# Patient Record
Sex: FEMALE | Race: WHITE
[De-identification: names, ages, dates, MRNs, and addresses within clinical notes are randomized per-mention and may not be internally consistent; named-entity substitution may affect disease eponyms.]

---

## 2017-04-20 ENCOUNTER — HOSPITAL ENCOUNTER (OUTPATIENT)
Dept: HOSPITAL 47 - RADMRIMAIN | Age: 22
Discharge: HOME | End: 2017-04-20
Payer: COMMERCIAL

## 2017-04-20 DIAGNOSIS — R51: ICD-10-CM

## 2017-04-20 DIAGNOSIS — J32.4: Primary | ICD-10-CM

## 2017-04-20 LAB
ALP SERPL-CCNC: 72 U/L (ref 38–126)
ALT SERPL-CCNC: 27 U/L (ref 9–52)
ANION GAP SERPL CALC-SCNC: 9 MMOL/L
AST SERPL-CCNC: 23 U/L (ref 14–36)
BUN SERPL-SCNC: 12 MG/DL (ref 7–17)
CALCIUM SPEC-MCNC: 9.7 MG/DL (ref 8.4–10.2)
CH: 27.3
CHCM: 31.8
CHLORIDE SERPL-SCNC: 108 MMOL/L (ref 98–107)
CO2 SERPL-SCNC: 27 MMOL/L (ref 22–30)
ERYTHROCYTE [DISTWIDTH] IN BLOOD BY AUTOMATED COUNT: 4.64 M/UL (ref 3.8–5.4)
ERYTHROCYTE [DISTWIDTH] IN BLOOD: 14 % (ref 11.5–15.5)
GLUCOSE SERPL-MCNC: 89 MG/DL (ref 74–99)
HCT VFR BLD AUTO: 40 % (ref 34–46)
HDW: 2.66
HGB BLD-MCNC: 12.4 GM/DL (ref 11.4–16)
MCH RBC QN AUTO: 26.8 PG (ref 25–35)
MCHC RBC AUTO-ENTMCNC: 31.1 G/DL (ref 31–37)
MCV RBC AUTO: 86 FL (ref 80–100)
NON-AFRICAN AMERICAN GFR(MDRD): >60
POTASSIUM SERPL-SCNC: 4.3 MMOL/L (ref 3.5–5.1)
PROT SERPL-MCNC: 7.5 G/DL (ref 6.3–8.2)
SODIUM SERPL-SCNC: 144 MMOL/L (ref 137–145)
WBC # BLD AUTO: 7.1 K/UL (ref 3.8–10.6)

## 2017-04-20 PROCEDURE — 70551 MRI BRAIN STEM W/O DYE: CPT

## 2017-04-20 PROCEDURE — 80053 COMPREHEN METABOLIC PANEL: CPT

## 2017-04-20 PROCEDURE — 85027 COMPLETE CBC AUTOMATED: CPT

## 2017-04-20 NOTE — MR
EXAMINATION TYPE: MR brain wo con

 

DATE OF EXAM: 4/20/2017 8:36 AM

 

COMPARISON: NONE

 

HISTORY: headaches

 

Multiplanar and multispin-echo imaging of the brain was performed .  

 

The ventricles, basal cisterns and sulci overlying the cerebral convexities are within normal limits.
 

 

There is no evidence for midline shift or mass effect.  

 

Acute intracranial hemorrhage or extra-axial collection is not evident. 

 

The brain parenchyma reveals no abnormal increased signal.  

 

No acute edema is identified.   

 

There is near complete opacification right maxillary sinus with obstruction of the right ostiomeatal 
unit. Mild mucosal thickening is seen of the left maxillary sinus. Moderate opacification is seen of 
the ethmoid air cells. There is a mild opacification and thickening of the sphenoid sinus and frontal
 sinuses. Nasal septum appears to be deviated from left to right.

 

IMPRESSION:

1. No intracranial abnormalities seen.

2. Severe chronic pansinusitis.

## 2017-06-25 ENCOUNTER — HOSPITAL ENCOUNTER (INPATIENT)
Dept: HOSPITAL 47 - EC | Age: 22
LOS: 2 days | Discharge: HOME | DRG: 153 | End: 2017-06-27
Payer: COMMERCIAL

## 2017-06-25 VITALS — BODY MASS INDEX: 36.3 KG/M2

## 2017-06-25 DIAGNOSIS — M41.9: ICD-10-CM

## 2017-06-25 DIAGNOSIS — E03.9: ICD-10-CM

## 2017-06-25 DIAGNOSIS — D72.810: ICD-10-CM

## 2017-06-25 DIAGNOSIS — J06.9: Primary | ICD-10-CM

## 2017-06-25 DIAGNOSIS — J32.0: ICD-10-CM

## 2017-06-25 DIAGNOSIS — G40.909: ICD-10-CM

## 2017-06-25 DIAGNOSIS — Z79.899: ICD-10-CM

## 2017-06-25 DIAGNOSIS — F32.9: ICD-10-CM

## 2017-06-25 DIAGNOSIS — F41.9: ICD-10-CM

## 2017-06-25 DIAGNOSIS — G89.29: ICD-10-CM

## 2017-06-25 LAB
ALP SERPL-CCNC: 72 U/L (ref 38–126)
ALT SERPL-CCNC: 43 U/L (ref 9–52)
ANION GAP SERPL CALC-SCNC: 10 MMOL/L
AST SERPL-CCNC: 31 U/L (ref 14–36)
BUN SERPL-SCNC: 14 MG/DL (ref 7–17)
CALCIUM SPEC-MCNC: 9.4 MG/DL (ref 8.4–10.2)
CHLORIDE SERPL-SCNC: 106 MMOL/L (ref 98–107)
CO2 SERPL-SCNC: 22 MMOL/L (ref 22–30)
GLUCOSE SERPL-MCNC: 112 MG/DL (ref 74–99)
NON-AFRICAN AMERICAN GFR(MDRD): >60
PARTICLE COUNT: 4096
PH UR: 6.5 [PH] (ref 5–8)
POTASSIUM SERPL-SCNC: 4.4 MMOL/L (ref 3.5–5.1)
PROT SERPL-MCNC: 7.5 G/DL (ref 6.3–8.2)
RBC UR QL: 2 /HPF (ref 0–5)
SODIUM SERPL-SCNC: 138 MMOL/L (ref 137–145)
SP GR UR: 1.02 (ref 1–1.03)
SQUAMOUS UR QL AUTO: 2 /HPF (ref 0–4)
UA BILLING (MACRO VS. MICRO): (no result)
UROBILINOGEN UR QL STRIP: <2 MG/DL (ref ?–2)
WBC #/AREA URNS HPF: 2 /HPF (ref 0–5)

## 2017-06-25 PROCEDURE — 80053 COMPREHEN METABOLIC PANEL: CPT

## 2017-06-25 PROCEDURE — 36415 COLL VENOUS BLD VENIPUNCTURE: CPT

## 2017-06-25 PROCEDURE — 99284 EMERGENCY DEPT VISIT MOD MDM: CPT

## 2017-06-25 PROCEDURE — 71020: CPT

## 2017-06-25 PROCEDURE — 93005 ELECTROCARDIOGRAM TRACING: CPT

## 2017-06-25 PROCEDURE — 81025 URINE PREGNANCY TEST: CPT

## 2017-06-25 PROCEDURE — 87430 STREP A AG IA: CPT

## 2017-06-25 PROCEDURE — 96360 HYDRATION IV INFUSION INIT: CPT

## 2017-06-25 PROCEDURE — 81001 URINALYSIS AUTO W/SCOPE: CPT

## 2017-06-25 PROCEDURE — 80048 BASIC METABOLIC PNL TOTAL CA: CPT

## 2017-06-25 PROCEDURE — 86308 HETEROPHILE ANTIBODY SCREEN: CPT

## 2017-06-25 PROCEDURE — 84100 ASSAY OF PHOSPHORUS: CPT

## 2017-06-25 PROCEDURE — 83605 ASSAY OF LACTIC ACID: CPT

## 2017-06-25 PROCEDURE — 83690 ASSAY OF LIPASE: CPT

## 2017-06-25 PROCEDURE — 87081 CULTURE SCREEN ONLY: CPT

## 2017-06-25 PROCEDURE — 96361 HYDRATE IV INFUSION ADD-ON: CPT

## 2017-06-25 PROCEDURE — 87040 BLOOD CULTURE FOR BACTERIA: CPT

## 2017-06-25 PROCEDURE — 85025 COMPLETE CBC W/AUTO DIFF WBC: CPT

## 2017-06-25 PROCEDURE — 83735 ASSAY OF MAGNESIUM: CPT

## 2017-06-26 VITALS — RESPIRATION RATE: 16 BRPM

## 2017-06-26 LAB
BASOPHILS # BLD AUTO: 0 K/UL (ref 0–0.2)
BASOPHILS NFR BLD AUTO: 0 %
CH: 28.2
CHCM: 33.2
EOSINOPHIL # BLD AUTO: 0 K/UL (ref 0–0.7)
EOSINOPHIL NFR BLD AUTO: 2 %
ERYTHROCYTE [DISTWIDTH] IN BLOOD BY AUTOMATED COUNT: 4.26 M/UL (ref 3.8–5.4)
ERYTHROCYTE [DISTWIDTH] IN BLOOD: 13.5 % (ref 11.5–15.5)
HCT VFR BLD AUTO: 36.2 % (ref 34–46)
HDW: 2.59
HGB BLD-MCNC: 11.8 GM/DL (ref 11.4–16)
LUC NFR BLD AUTO: 3 %
LYMPHOCYTES # SPEC AUTO: 0.2 K/UL (ref 1–4.8)
LYMPHOCYTES NFR SPEC AUTO: 12 %
MCH RBC QN AUTO: 27.7 PG (ref 25–35)
MCHC RBC AUTO-ENTMCNC: 32.6 G/DL (ref 31–37)
MCV RBC AUTO: 85.1 FL (ref 80–100)
MONOCYTES # BLD AUTO: 0.1 K/UL (ref 0–1)
MONOCYTES NFR BLD AUTO: 5 %
NEUTROPHILS # BLD AUTO: 1.2 K/UL (ref 1.3–7.7)
NEUTROPHILS NFR BLD AUTO: 79 %
WBC # BLD AUTO: 0.05 10*3/UL
WBC # BLD AUTO: 1.5 K/UL (ref 3.8–10.6)
WBC (PEROX): 1.79

## 2017-06-26 RX ADMIN — TOPIRAMATE SCH MG: 25 TABLET, FILM COATED ORAL at 09:26

## 2017-06-26 RX ADMIN — SERTRALINE HYDROCHLORIDE SCH MG: 25 TABLET ORAL at 09:26

## 2017-06-26 RX ADMIN — NORGESTIMATE AND ETHINYL ESTRADIOL SCH: KIT at 09:35

## 2017-06-26 RX ADMIN — Medication SCH UNIT: at 13:06

## 2017-06-26 RX ADMIN — NORGESTIMATE AND ETHINYL ESTRADIOL SCH EACH: KIT at 09:26

## 2017-06-26 RX ADMIN — CEFAZOLIN SCH MLS/HR: 330 INJECTION, POWDER, FOR SOLUTION INTRAMUSCULAR; INTRAVENOUS at 19:19

## 2017-06-26 RX ADMIN — CEFAZOLIN SCH: 330 INJECTION, POWDER, FOR SOLUTION INTRAMUSCULAR; INTRAVENOUS at 07:11

## 2017-06-26 RX ADMIN — CEFAZOLIN SCH MLS/HR: 330 INJECTION, POWDER, FOR SOLUTION INTRAMUSCULAR; INTRAVENOUS at 09:27

## 2017-06-26 RX ADMIN — LEVOTHYROXINE SODIUM SCH MCG: 50 TABLET ORAL at 06:25

## 2017-06-26 NOTE — ED
Fever HPI





- General


Chief Complaint: Fever


Stated Complaint: fever


Source: patient


Mode of arrival: ambulatory


Limitations: no limitations





- History of Present Illness


Initial Comments: 





21-year-old  female with no past medical history presented for 

evaluation of fever for the last 2 days.  She denies any other associated 

symptoms although she stated at one point she had a little bit of lower 

abdominal pain for a couple minutes couple days ago which has since resolved.  

She states earlier today she had a little bit of shortness of breath and cough 

for less than a minute which has since resolved.  She denies any lightheadedness

, dizziness, nausea, vomiting, chest pain, cough, abdominal pain.  She does 

admit to a mild frontal headache for the last couple hours but otherwise 

nothing else significant.  No change in vision.  She also had a rash a couple 

days ago but this has also since resolved.





- Related Data


 Home Medications











 Medication  Instructions  Recorded  Confirmed


 


Levothyroxine Sodium [Synthroid] 50 mcg PO DAILY 09/02/16 06/25/17


 


Albuterol Sulfate [Proair Hfa] 2 puff INHALATION RT-QID PRN 06/25/17 06/25/17


 


Cholecalciferol [Vitamin D3] 1,000 unit PO DAILY 06/25/17 06/25/17


 


Norgestimate-Ethinyl Estradiol 1 tab PO DAILY 06/25/17 06/25/17





[Sprintec 28 Day Tablet]   


 


Sertraline [Zoloft] 25 mg PO DAILY 06/25/17 06/25/17


 


Sulfamethox-Tmp 800-160Mg [Bactrim 1 tab PO BID 06/25/17 06/25/17





-160 mg]   


 


Topiramate [Topamax] 50 mg PO DAILY 06/25/17 06/25/17











 Allergies











Allergy/AdvReac Type Severity Reaction Status Date / Time


 


Milk Containing Products Allergy  Swelling Verified 06/25/17 22:30


 


soy Allergy  Swelling Verified 06/25/17 22:30


 


sumatriptan [From Imitrex] Allergy  Anaphylaxis Verified 06/25/17 22:30


 


wheat Allergy  Swelling Verified 06/25/17 22:30














Review of Systems


ROS Statement: 


Those systems with pertinent positive or pertinent negative responses have been 

documented in the HPI.





ROS Other: All systems not noted in ROS Statement are negative.


Constitutional: Reports: fever, chills


Eyes: Denies: eye pain, eye discharge


ENT: Denies: ear pain, throat pain


Respiratory: Reports: dyspnea (Minor episode lasting less than a minute earlier 

today).  Denies: cough


Cardiovascular: Denies: chest pain, palpitations


Endocrine: Denies: fatigue, polydipsia, polyuria


Gastrointestinal: Denies: abdominal pain, nausea, vomiting, diarrhea, 

constipation


Genitourinary: Denies: urgency, dysuria, frequency


Musculoskeletal: Denies: back pain, arthralgia, myalgia


Skin: Reports: rash (Nearly resolved).  Denies: lesions


Neurological: Denies: headache, weakness


Psychiatric: Denies: anxiety, depression


Hematological/Lymphatic: Denies: easy bleeding, easy bruising





Past Medical History


Past Medical History: No Reported History


History of Any Multi-Drug Resistant Organisms: None Reported


Past Surgical History: No Surgical Hx Reported


Past Psychological History: Anxiety, Depression


Smoking Status: Never smoker


Past Alcohol Use History: Rare


Past Drug Use History: None Reported





General Exam


Limitations: no limitations


General appearance: alert, in no apparent distress


Head exam: Present: atraumatic, normocephalic, normal inspection


Eye exam: Present: normal appearance, PERRL, EOMI.  Absent: scleral icterus, 

conjunctival injection, periorbital swelling


ENT exam: Present: normal exam, mucous membranes moist


Neck exam: Present: normal inspection.  Absent: tenderness, meningismus, 

lymphadenopathy


Respiratory exam: Present: normal lung sounds bilaterally.  Absent: respiratory 

distress, wheezes, rales, rhonchi, stridor


Cardiovascular Exam: Present: regular rate, normal rhythm, normal heart sounds.

  Absent: systolic murmur, diastolic murmur, rubs, gallop, clicks


GI/Abdominal exam: Present: soft, normal bowel sounds.  Absent: distended, 

tenderness, guarding, rebound, rigid


Rectal exam: Present: deferred


Extremities exam: Present: normal inspection, full ROM, normal capillary 

refill.  Absent: tenderness, pedal edema, joint swelling, calf tenderness


Back exam: Present: normal inspection


Neurological exam: Present: alert, oriented X3, CN II-XII intact


Psychiatric exam: Present: normal affect, normal mood


Skin exam: Present: warm, dry, intact, rash (Right medial upper arm nearly 

resolved.  There are also areas where she has been scratching causing minor 

abrasions to the skin.)





Course


 Vital Signs











  06/25/17 06/26/17 06/26/17





  22:09 00:04 01:31


 


Temperature 103.1 F H 100.4 F H 100.7 F H


 


Pulse Rate 141 H 120 H 119 H


 


Respiratory 22 16 18





Rate   


 


Blood Pressure 127/81 137/76 132/77


 


O2 Sat by Pulse 99 98 98





Oximetry   














Medical Decision Making





- Medical Decision Making





21-year-old  female presented for evaluation of fever for 2 days.  On 

HPI there is no source of infection elicited as she denies any abdominal pain, 

dysuria, shortness of breath/cough, and only admits to a mild headache started 

today without neck stiffness.  She does have chronic back pain due to scoliosis 

and this is unchanged.  On physical examination lungs are clear to auscultation 

bilaterally abdomen is soft and nontender without peritoneal signs of guarding, 

rigidity, rebound, and she is neurologically intact with cranial nerves II 

through XII intact without focal neurologic deficits and normal gait and 

station.  Vitals reveal a temperature of 103.1 and a markedly tachycardia of 

140.  Patient was provided with IV fluid and labs revealed a marked leukopenia 

although her vitals improved mildly with a temperature of 100.4 and a 

tachycardia of 120.  There is no source of infection and therefore she has not 

technically meet for sepsis.  Her lactic acid is below 2.  However we'll cover 

with antibiotics and admit patient to Dr. Bains for further treatment and 

evaluation in the morning. Bed request submitted and admission order placed.





- Lab Data


Result diagrams: 


 06/26/17 00:04





 06/25/17 23:19


 Lab Results











  06/25/17 06/25/17 06/25/17 Range/Units





  23:19 23:19 23:19 


 


WBC     (3.8-10.6)  k/uL


 


RBC     (3.80-5.40)  m/uL


 


Hgb     (11.4-16.0)  gm/dL


 


Hct     (34.0-46.0)  %


 


MCV     (80.0-100.0)  fL


 


MCH     (25.0-35.0)  pg


 


MCHC     (31.0-37.0)  g/dL


 


RDW     (11.5-15.5)  %


 


Plt Count     (150-450)  k/uL


 


Neutrophils %     %


 


Lymphocytes %     %


 


Monocytes %     %


 


Eosinophils %     %


 


Basophils %     %


 


Neutrophils #     (1.3-7.7)  k/uL


 


Lymphocytes #     (1.0-4.8)  k/uL


 


Monocytes #     (0-1.0)  k/uL


 


Eosinophils #     (0-0.7)  k/uL


 


Basophils #     (0-0.2)  k/uL


 


Sodium   138   (137-145)  mmol/L


 


Potassium   4.4   (3.5-5.1)  mmol/L


 


Chloride   106   ()  mmol/L


 


Carbon Dioxide   22   (22-30)  mmol/L


 


Anion Gap   10   mmol/L


 


BUN   14   (7-17)  mg/dL


 


Creatinine   1.00   (0.52-1.04)  mg/dL


 


Est GFR (MDRD) Af Amer   >60   (>60 ml/min/1.73 sqM)  


 


Est GFR (MDRD) Non-Af   >60   (>60 ml/min/1.73 sqM)  


 


Glucose   112 H   (74-99)  mg/dL


 


Plasma Lactic Acid Jagdeep     (0.7-2.0)  mmol/L


 


Calcium   9.4   (8.4-10.2)  mg/dL


 


Total Bilirubin   0.4   (0.2-1.3)  mg/dL


 


AST   31   (14-36)  U/L


 


ALT   43   (9-52)  U/L


 


Alkaline Phosphatase   72   ()  U/L


 


Total Protein   7.5   (6.3-8.2)  g/dL


 


Albumin   4.5   (3.5-5.0)  g/dL


 


Lipase   67   ()  U/L


 


Urine Color  Yellow    


 


Urine Appearance  Clear    (Clear)  


 


Urine pH  6.5    (5.0-8.0)  


 


Ur Specific Gravity  1.025    (1.001-1.035)  


 


Urine Protein  Trace H    (Negative)  


 


Urine Glucose (UA)  Negative    (Negative)  


 


Urine Ketones  Negative    (Negative)  


 


Urine Blood  Moderate H    (Negative)  


 


Urine Nitrite  Negative    (Negative)  


 


Urine Bilirubin  Negative    (Negative)  


 


Urine Urobilinogen  <2.0    (<2.0)  mg/dL


 


Ur Leukocyte Esterase  Negative    (Negative)  


 


Urine RBC  2    (0-5)  /hpf


 


Urine WBC  2    (0-5)  /hpf


 


Ur Squamous Epith Cells  2    (0-4)  /hpf


 


Urine Mucus  Rare H    (None)  /hpf


 


Urine HCG, Qual    Not Detected  (Not Detectd)  


 


Group A Strep Rapid     (Negative)  














  06/25/17 06/25/17 06/26/17 Range/Units





  23:19 23:19 00:04 


 


WBC    1.5 L*  (3.8-10.6)  k/uL


 


RBC    4.26  (3.80-5.40)  m/uL


 


Hgb    11.8  (11.4-16.0)  gm/dL


 


Hct    36.2  (34.0-46.0)  %


 


MCV    85.1  (80.0-100.0)  fL


 


MCH    27.7  (25.0-35.0)  pg


 


MCHC    32.6  (31.0-37.0)  g/dL


 


RDW    13.5  (11.5-15.5)  %


 


Plt Count    179  (150-450)  k/uL


 


Neutrophils %    79  %


 


Lymphocytes %    12  %


 


Monocytes %    5  %


 


Eosinophils %    2  %


 


Basophils %    0  %


 


Neutrophils #    1.2 L  (1.3-7.7)  k/uL


 


Lymphocytes #    0.2 L  (1.0-4.8)  k/uL


 


Monocytes #    0.1  (0-1.0)  k/uL


 


Eosinophils #    0.0  (0-0.7)  k/uL


 


Basophils #    0.0  (0-0.2)  k/uL


 


Sodium     (137-145)  mmol/L


 


Potassium     (3.5-5.1)  mmol/L


 


Chloride     ()  mmol/L


 


Carbon Dioxide     (22-30)  mmol/L


 


Anion Gap     mmol/L


 


BUN     (7-17)  mg/dL


 


Creatinine     (0.52-1.04)  mg/dL


 


Est GFR (MDRD) Af Amer     (>60 ml/min/1.73 sqM)  


 


Est GFR (MDRD) Non-Af     (>60 ml/min/1.73 sqM)  


 


Glucose     (74-99)  mg/dL


 


Plasma Lactic Acid Jagdeep  1.5    (0.7-2.0)  mmol/L


 


Calcium     (8.4-10.2)  mg/dL


 


Total Bilirubin     (0.2-1.3)  mg/dL


 


AST     (14-36)  U/L


 


ALT     (9-52)  U/L


 


Alkaline Phosphatase     ()  U/L


 


Total Protein     (6.3-8.2)  g/dL


 


Albumin     (3.5-5.0)  g/dL


 


Lipase     ()  U/L


 


Urine Color     


 


Urine Appearance     (Clear)  


 


Urine pH     (5.0-8.0)  


 


Ur Specific Gravity     (1.001-1.035)  


 


Urine Protein     (Negative)  


 


Urine Glucose (UA)     (Negative)  


 


Urine Ketones     (Negative)  


 


Urine Blood     (Negative)  


 


Urine Nitrite     (Negative)  


 


Urine Bilirubin     (Negative)  


 


Urine Urobilinogen     (<2.0)  mg/dL


 


Ur Leukocyte Esterase     (Negative)  


 


Urine RBC     (0-5)  /hpf


 


Urine WBC     (0-5)  /hpf


 


Ur Squamous Epith Cells     (0-4)  /hpf


 


Urine Mucus     (None)  /hpf


 


Urine HCG, Qual     (Not Detectd)  


 


Group A Strep Rapid   Negative   (Negative)  














06/26/17 02:04


Sinus tachycardia with ventricular rate 123, JORGE 124, QRS 78, QT//426.





Disposition


Clinical Impression: 


 Fever, Leukopenia, Tachycardia





Disposition: ADMITTED AS IP TO THIS HOSP


Referrals: 


JUDY Pandya III, MD [Primary Care Provider] - 1-2 days


Decision to Admit Reason: Admit from EC


Decision Date: 06/26/17


Decision Time: 01:42

## 2017-06-26 NOTE — XR
EXAM:

  XR Chest, 2 Views

 

CLINICAL HISTORY:

  Reason: cough

 

TECHNIQUE:

  Frontal and lateral views of the chest.

 

COMPARISON:

  No relevant prior studies available.

 

FINDINGS:

  Lungs:  Unremarkable.  No consolidation.

  Pleural space:  Unremarkable.  No pneumothorax.

  Heart:  Unremarkable.  No cardiomegaly.

  Mediastinum:  Unremarkable.

  Bones/joints:  Unremarkable.

 

IMPRESSION:     

  Unremarkable chest x-rays.

## 2017-06-26 NOTE — P.HPIM
History of Present Illness


H&P Date: 06/26/17





21-year-old female with history of right-sided sinusitis who has been recently 

started on Bactrim by an ENT physician comes in to the hospital with complaints 

of fevers that has been ongoing for the last 3 days.  There is no specific time 

and the fever appears to occur





Patient's T-max has been 103F





States to have some complains of a cough nonproductive in nature





Patient has undergone a rapid strep test which was negative





States to have generalized weakness no headaches blurry vision neck stiffness 

nausea vomiting chest pain urinary urgency or frequency vaginal discharge





Patient is monogamous with one male





Was noted to have severe leukopenia denies having any lumps in her throat 

axilla or her groin





Review of Systems


All systems: negative (Noted in HPI)





Past Medical History


Past Medical History: No Reported History


Additional Past Medical History / Comment(s): hypothyroid


History of Any Multi-Drug Resistant Organisms: None Reported


Past Surgical History: No Surgical Hx Reported


Past Psychological History: Anxiety, Depression


Smoking Status: Never smoker


Past Alcohol Use History: Rare


Past Drug Use History: None Reported





Medications and Allergies


Home Medications and Allergies Comment(s): 


Physical exam Gen. appearance oriented 3 in no distress





Neck is supple no JVD





Lungs good air entry clear to auscultation no rhonchi or wheezing





Heart S1-S2 heard regular rate and rhythm no murmurs appreciated 





Abdomen is soft nontender no organomegaly bowel sounds are intact





Neurologically cranial nerves II-12 grossly intact no focal motor or sensory 

deficits noted





Skin no abnormalities appreciated





Oral cavity no significant tonsillar edema no erythema in the posterior pharynx





No lymphadenopathy noted in her cervical region axillary region supraclavicular 

region or her inguinal area





 Home Medications











 Medication  Instructions  Recorded  Confirmed  Type


 


Levothyroxine Sodium [Synthroid] 50 mcg PO DAILY 09/02/16 06/25/17 History


 


Albuterol Sulfate [Proair Hfa] 2 puff INHALATION RT-QID PRN 06/25/17 06/25/17 

History


 


Cholecalciferol [Vitamin D3] 1,000 unit PO DAILY 06/25/17 06/25/17 History


 


Norgestimate-Ethinyl Estradiol 1 tab PO DAILY 06/25/17 06/25/17 History





[Sprintec 28 Day Tablet]    


 


Sertraline [Zoloft] 25 mg PO DAILY 06/25/17 06/25/17 History


 


Sulfamethox-Tmp 800-160Mg [Bactrim 1 tab PO BID 06/25/17 06/25/17 History





-160 mg]    


 


Topiramate [Topamax] 50 mg PO DAILY 06/25/17 06/25/17 History











 Allergies











Allergy/AdvReac Type Severity Reaction Status Date / Time


 


ceftriaxone [From Rocephin] Allergy  Rash/Hives Verified 06/26/17 06:12


 


Milk Containing Products Allergy  Swelling Verified 06/25/17 22:30


 


soy Allergy  Swelling Verified 06/25/17 22:30


 


sumatriptan [From Imitrex] Allergy  Anaphylaxis Verified 06/25/17 22:30


 


wheat Allergy  Swelling Verified 06/25/17 22:30














Physical Exam


Vitals: 


 Vital Signs











  Temp Pulse Pulse Resp BP BP Pulse Ox


 


 06/26/17 19:25  97.7 F   76  16   129/70  97


 


 06/26/17 15:00  97.9 F   80  16   114/79  97


 


 06/26/17 08:00    117 H    


 


 06/26/17 07:00  98.8 F   117 H  16   116/67  94 L


 


 06/26/17 04:23  103.3 F H   137 H  16   118/71  100


 


 06/26/17 01:57  99.5 F  119 H   16  132/77   99


 


 06/26/17 01:31  100.7 F H  119 H   18  132/77   98


 


 06/26/17 00:04  100.4 F H  120 H   16  137/76   98


 


 06/25/17 22:09  103.1 F H  141 H   22  127/81   99








 Intake and Output











 06/26/17 06/26/17 06/26/17





 06:59 14:59 22:59


 


Intake Total  2598 


 


Balance  2598 


 


Intake:   


 


  Intake, IV Titration  938 





  Amount   


 


    Sodium Chloride 0.9% 1,  938 





    000 ml @ 125 mls/hr IV .   





    [facility] Rx#:[id number]   


 


  Oral  1660 


 


Other:   


 


  Weight 90.265 kg  














Results


CBC & Chem 7: 


 06/26/17 00:04





 06/25/17 23:19


Labs: 


 Abnormal Lab Results - Last 24 Hours (Table)











  06/25/17 06/25/17 06/26/17 Range/Units





  23:19 23:19 00:04 


 


WBC    1.5 L*  (3.8-10.6)  k/uL


 


Neutrophils #    1.2 L  (1.3-7.7)  k/uL


 


Lymphocytes #    0.2 L  (1.0-4.8)  k/uL


 


Glucose   112 H   (74-99)  mg/dL


 


Urine Protein  Trace H    (Negative)  


 


Urine Blood  Moderate H    (Negative)  


 


Urine Mucus  Rare H    (None)  /hpf








 Microbiology - Last 24 Hours (Table)











 06/25/17 23:19 Group A Strep Throat Culture - Preliminary





 Throat 














Thrombosis Risk Factor Assmnt





- Choose All That Apply


Each Factor Represents 1 point: Obesity (BMI >25), Oral contraceptives or 

hormone replacement therapy


Thrombosis Risk Factor Assessment Total Risk Factor Score: 2


Thrombosis Risk Factor Assessment Level: Low Risk





Assessment and Plan


Plan: 





#1 leukopenia with severe lymphopenia likely secondary to viral etiology





#2 acute viral URI causing fevers





#3 history of seizure disorder





#4 hypothyroidism





#5 chronic right-sided sinusitis of the maxillary sinus





Plan





Continue with IV hydration





We'll obtain a heterophile antibody repeat labs in a.m.  Patient received a 

dose of Decadron this morning





Patient also received a dose of Rocephin on admission per patient was noted to 

have a rash





If patient's white count does not improve will need to evaluate certain rare 

disorders patient's onset of symptoms and rapidity of for symptoms appear to be 

viral in nature we'll hold off on antibiotic therapy at this time I do not 

notice a rash at this time

## 2017-06-27 VITALS — HEART RATE: 64 BPM | DIASTOLIC BLOOD PRESSURE: 56 MMHG | TEMPERATURE: 97.9 F | SYSTOLIC BLOOD PRESSURE: 120 MMHG

## 2017-06-27 LAB
ANION GAP SERPL CALC-SCNC: 11 MMOL/L
BASOPHILS # BLD AUTO: 0 K/UL (ref 0–0.2)
BASOPHILS NFR BLD AUTO: 0 %
BUN SERPL-SCNC: 8 MG/DL (ref 7–17)
CALCIUM SPEC-MCNC: 9 MG/DL (ref 8.4–10.2)
CH: 27.8
CHCM: 33.3
CHLORIDE SERPL-SCNC: 110 MMOL/L (ref 98–107)
CO2 SERPL-SCNC: 19 MMOL/L (ref 22–30)
EOSINOPHIL # BLD AUTO: 0 K/UL (ref 0–0.7)
EOSINOPHIL NFR BLD AUTO: 1 %
ERYTHROCYTE [DISTWIDTH] IN BLOOD BY AUTOMATED COUNT: 4.19 M/UL (ref 3.8–5.4)
ERYTHROCYTE [DISTWIDTH] IN BLOOD: 13.5 % (ref 11.5–15.5)
GLUCOSE SERPL-MCNC: 109 MG/DL (ref 74–99)
HCT VFR BLD AUTO: 35.1 % (ref 34–46)
HDW: 2.72
HGB BLD-MCNC: 11.6 GM/DL (ref 11.4–16)
LUC NFR BLD AUTO: 4 %
LYMPHOCYTES # SPEC AUTO: 0.5 K/UL (ref 1–4.8)
LYMPHOCYTES NFR SPEC AUTO: 20 %
MAGNESIUM SPEC-SCNC: 1.8 MG/DL (ref 1.6–2.3)
MCH RBC QN AUTO: 27.6 PG (ref 25–35)
MCHC RBC AUTO-ENTMCNC: 32.9 G/DL (ref 31–37)
MCV RBC AUTO: 83.7 FL (ref 80–100)
MONOCYTES # BLD AUTO: 0.2 K/UL (ref 0–1)
MONOCYTES NFR BLD AUTO: 7 %
NEUTROPHILS # BLD AUTO: 1.7 K/UL (ref 1.3–7.7)
NEUTROPHILS NFR BLD AUTO: 69 %
NON-AFRICAN AMERICAN GFR(MDRD): >60
PHOSPHATE SERPL-MCNC: 3.1 MG/DL (ref 2.5–4.5)
POTASSIUM SERPL-SCNC: 3.9 MMOL/L (ref 3.5–5.1)
SODIUM SERPL-SCNC: 140 MMOL/L (ref 137–145)
WBC # BLD AUTO: 0.09 10*3/UL
WBC # BLD AUTO: 2.5 K/UL (ref 3.8–10.6)
WBC (PEROX): 2.82

## 2017-06-27 RX ADMIN — SERTRALINE HYDROCHLORIDE SCH MG: 25 TABLET ORAL at 08:36

## 2017-06-27 RX ADMIN — Medication SCH: at 13:20

## 2017-06-27 RX ADMIN — CEFAZOLIN SCH MLS/HR: 330 INJECTION, POWDER, FOR SOLUTION INTRAMUSCULAR; INTRAVENOUS at 08:37

## 2017-06-27 RX ADMIN — CEFAZOLIN SCH: 330 INJECTION, POWDER, FOR SOLUTION INTRAMUSCULAR; INTRAVENOUS at 03:06

## 2017-06-27 RX ADMIN — LEVOTHYROXINE SODIUM SCH MCG: 50 TABLET ORAL at 05:45

## 2017-06-27 RX ADMIN — TOPIRAMATE SCH MG: 25 TABLET, FILM COATED ORAL at 08:36

## 2017-06-27 NOTE — P.DS
Providers


Date of admission: 


06/26/17 01:40





Attending physician: 


Charlee Bains





Primary care physician: 


[facility] [facility] 





21-year-old female with history of right-sided sinusitis who has been recently 

started on Bactrim by an ENT physician comes in to the hospital with complaints 

of fevers that has been ongoing for the last 3 days.  There is no specific time 

and the fever appears to occur





Patient's T-max has been 103F





States to have some complains of a cough nonproductive in nature





Patient has undergone a rapid strep test which was negative





States to have generalized weakness no headaches blurry vision neck stiffness 

nausea vomiting chest pain urinary urgency or frequency vaginal discharge





Patient is monogamous with one male





Was noted to have severe leukopenia denies having any lumps in her throat 

axilla or her groin





06/27/2017





No overnight events





No fevers were reported patient states to be feeling better





Brief history patient was admitted with fevers of unknown origin 3 days


Physical exam Gen. appearance oriented 3 in no distress





Neck is supple no JVD





Lungs good air entry clear to auscultation no rhonchi or wheezing





Heart S1-S2 heard regular rate and rhythm no murmurs appreciated 





Abdomen is soft nontender no organomegaly bowel sounds are intact





Neurologically cranial nerves II-12 grossly intact no focal motor or sensory 

deficits noted





Skin no abnormalities appreciated





Oral cavity no significant tonsillar edema no erythema in the posterior pharynx





No lymphadenopathy noted in her cervical region axillary region supraclavicular 

region or her inguinal area











Assessment and Plan


Plan: 





#1 leukopenia with severe lymphopenia likely secondary to viral etiology versus 

drug-induced





#2 acute viral URI causing fevers





#3 history of seizure disorder





#4 hypothyroidism





#5 chronic right-sided sinusitis of the maxillary sinus








Discontinue Bactrim at this time





Patient has been afebrile for 24 hours white count has improved if this is a 

viral etiology patient will need a repeat CBC in 7 days to ensure improvement 

of her white count if patient continues to have symptoms thereafter would 

benefit from further workup patient does not have any lymph nodes that are 

enlarged heterophile antibody and strep test were negative


e





Plan - Discharge Summary


New Discharge Prescriptions: 


Continue


   Levothyroxine Sodium [Synthroid] 50 mcg PO DAILY


   Norgestimate-Ethinyl Estradiol [Sprintec 28 Day Tablet] 1 tab PO DAILY


   Albuterol Sulfate [Proair Hfa] 2 puff INHALATION RT-QID PRN


     PRN Reason: Shortness Of Breath


   Topiramate [Topamax] 50 mg PO DAILY


   Sertraline [Zoloft] 25 mg PO DAILY


   Cholecalciferol [Vitamin D3] 1,000 unit PO DAILY





Discontinued


   Sulfamethox-Tmp 800-160Mg [Bactrim -160 mg] 1 tab PO BID


Discharge Medication List





Levothyroxine Sodium [Synthroid] 50 mcg PO DAILY 09/02/16 [History]


Albuterol Sulfate [Proair Hfa] 2 puff INHALATION RT-QID PRN 06/25/17 [History]


Cholecalciferol [Vitamin D3] 1,000 unit PO DAILY 06/25/17 [History]


Norgestimate-Ethinyl Estradiol [Sprintec 28 Day Tablet] 1 tab PO DAILY 06/25/17 

[History]


Sertraline [Zoloft] 25 mg PO DAILY 06/25/17 [History]


Topiramate [Topamax] 50 mg PO DAILY 06/25/17 [History]








Follow up Appointment(s)/Referral(s): 


JUDY Garcia III, MD [Primary Care Provider] - 06/28/17 11:00 am


Patient Instructions/Handouts:  Fever in Adults (GEN)


Care Plan Goals (MU): 


CBC WITH DR. GARCIA IN 1 WEEK





Discharge Disposition: HOME SELF-CARE

## 2018-11-06 ENCOUNTER — HOSPITAL ENCOUNTER (EMERGENCY)
Dept: HOSPITAL 47 - EC | Age: 23
Discharge: HOME | End: 2018-11-06
Payer: COMMERCIAL

## 2018-11-06 VITALS — SYSTOLIC BLOOD PRESSURE: 132 MMHG | RESPIRATION RATE: 18 BRPM | DIASTOLIC BLOOD PRESSURE: 80 MMHG | TEMPERATURE: 98.3 F

## 2018-11-06 VITALS — HEART RATE: 92 BPM

## 2018-11-06 DIAGNOSIS — Z88.1: ICD-10-CM

## 2018-11-06 DIAGNOSIS — R09.81: ICD-10-CM

## 2018-11-06 DIAGNOSIS — Z91.018: ICD-10-CM

## 2018-11-06 DIAGNOSIS — Z88.2: ICD-10-CM

## 2018-11-06 DIAGNOSIS — R05: ICD-10-CM

## 2018-11-06 DIAGNOSIS — R06.02: ICD-10-CM

## 2018-11-06 DIAGNOSIS — O99.89: Primary | ICD-10-CM

## 2018-11-06 DIAGNOSIS — Z3A.33: ICD-10-CM

## 2018-11-06 PROCEDURE — 94640 AIRWAY INHALATION TREATMENT: CPT

## 2018-11-06 PROCEDURE — 71046 X-RAY EXAM CHEST 2 VIEWS: CPT

## 2018-11-06 PROCEDURE — 99285 EMERGENCY DEPT VISIT HI MDM: CPT

## 2018-11-06 NOTE — XR
EXAMINATION TYPE: XR chest 2V

 

DATE OF EXAM: 11/6/2018

 

COMPARISON: 6/26/2017

 

HISTORY: Cough

 

TECHNIQUE:  Frontal and lateral views of the chest are obtained.

 

FINDINGS:  Heart and mediastinum are normal. Lungs are clear. Diaphragm is normal. Bony thorax is int
act. Pulmonary vascularity is normal.

 

IMPRESSION:  Normal chest. No change.

## 2018-11-06 NOTE — ED
SOB HPI





- General


Chief Complaint: Shortness of Breath


Stated Complaint: SOB


Time Seen by Provider: 18 02:39


Source: patient, RN notes reviewed, old records reviewed


Mode of arrival: ambulatory


Limitations: no limitations





- History of Present Illness


Initial Comments: 





Patient is a 23 year old female,  that is 33 weeks pregnant. Patient is 

here for cough and congestion. She had a cold last week, and reports that it 

has settled into her chest. Patient reports that she has a dry non productive 

cough. She is a non smoker, with history of asthma. Patient has no chest pain, 

denies abdominal pain or vaginal bleeding or discharge. Patient stats no fevers 

or chills. Denies leg swelling, hemoptysis. 





- Related Data


 Home Medications











 Medication  Instructions  Recorded  Confirmed


 


Pnv No.95/Ferrous Fum/Folic AC 1 each PO DAILY 18





[Prenatal Multivitamin Tablet]   








 Previous Rx's











 Medication  Instructions  Recorded


 


Albuterol Inhaler [Ventolin Hfa 1 - 2 puff INHALATION RT-Q6H PRN 18





Inhaler] #1 inhaler 


 


Amoxicillin 500 mg PO Q8H #21 capsule 18











 Allergies











Allergy/AdvReac Type Severity Reaction Status Date / Time


 


ceftriaxone [From Rocephin] Allergy  Rash/Hives Verified 17 06:12


 


soy Allergy  Swelling Verified 17 22:30


 


sulfamethoxazole Allergy  Rash/Hives Verified 18 02:35





[From Bactrim]     


 


sumatriptan [From Imitrex] Allergy  Anaphylaxis Verified 17 22:30


 


trimethoprim [From Bactrim] Allergy  Rash/Hives Verified 18 02:35














Review of Systems


ROS Statement: 


Those systems with pertinent positive or pertinent negative responses have been 

documented in the HPI.





ROS Other: All systems not noted in ROS Statement are negative.





Past Medical History


Past Medical History: No Reported History


Additional Past Medical History / Comment(s): hypothyroid


History of Any Multi-Drug Resistant Organisms: None Reported


Past Surgical History: No Surgical Hx Reported


Past Psychological History: Anxiety, Depression


Smoking Status: Never smoker


Past Alcohol Use History: Rare


Past Drug Use History: None Reported





General Exam





- General Exam Comments


Initial Comments: 





This is a 23 year old female, no acute distress. 


Limitations: no limitations


General appearance: alert, in no apparent distress


Head exam: Present: atraumatic, normocephalic, normal inspection


Eye exam: Present: normal appearance, PERRL, EOMI.  Absent: scleral icterus, 

conjunctival injection, periorbital swelling


ENT exam: Present: normal exam, mucous membranes moist.  Absent: normal 

oropharynx (erythematous oropharynx)


Neck exam: Present: lymphadenopathy (tonsillar)


Respiratory exam: Present: wheezes (slight wheezing in lower lung field).  

Absent: normal lung sounds bilaterally, respiratory distress, rales, rhonchi, 

stridor


Cardiovascular Exam: Present: regular rate, normal rhythm, normal heart sounds.

  Absent: systolic murmur, diastolic murmur, rubs, gallop, clicks


GI/Abdominal exam: Present: soft, normal bowel sounds.  Absent: distended, 

tenderness, guarding, rebound, rigid


Extremities exam: Present: normal inspection, full ROM, normal capillary 

refill.  Absent: tenderness, pedal edema, joint swelling, calf tenderness


Back exam: Present: normal inspection


Neurological exam: Present: alert, oriented X3, CN II-XII intact


Psychiatric exam: Present: normal affect, normal mood


Skin exam: Present: warm, dry, intact, normal color.  Absent: rash





Course


 Vital Signs











  18





  02:32 03:18 03:25


 


Temperature 98.3 F  


 


Pulse Rate 90 84 92


 


Respiratory 18  





Rate   


 


Blood Pressure 132/80  


 


O2 Sat by Pulse 97  





Oximetry   














Medical Decision Making





- Medical Decision Making





23 year old female, 33 weeks pregnant complains of cough, congestion and 

shortness of breath. Denies chest pain. patient has slight wheezing on exam.  

Patient has no swelling, patient has no chest pain. Patient symptoms seem to be 

upper respiratory infection, less clinical concern for PE. PAtient agrees. 

Discussed starting patient on inhaler and amoxicillin for upper respiratory 

infection and sinus congestion. Discussed strict return parameters if she has  

chest pain, or any other concern. Patient agrees to treatment plan and will 

comply. 





Disposition


Clinical Impression: 


 Cough





Disposition: HOME SELF-CARE


Condition: Good


Instructions:  Bronchospasm (ED)


Additional Instructions: 


Patient advised to rest, drink plenty of fluids.  Patient should take the 

medications as prescribed.  Follow-up with primary care physician and OB/GYN.  

Return to emergency department if any alarming signs or symptoms occur.


Prescriptions: 


Albuterol Inhaler [Ventolin Hfa Inhaler] 1 - 2 puff INHALATION RT-Q6H PRN #1 

inhaler


 PRN Reason: Shortness Of Breath


Amoxicillin 500 mg PO Q8H #21 capsule


Is patient prescribed a controlled substance at d/c from ED?: No


Referrals: 


None,Stated [Primary Care Provider] - 1-2 days


Lizzie Mckeon MD [STAFF PHYSICIAN] - 1-2 days


Time of Disposition: 04:36

## 2018-11-08 ENCOUNTER — HOSPITAL ENCOUNTER (EMERGENCY)
Dept: HOSPITAL 47 - EC | Age: 23
Discharge: HOME | End: 2018-11-08
Payer: COMMERCIAL

## 2018-11-08 VITALS — RESPIRATION RATE: 20 BRPM

## 2018-11-08 VITALS — TEMPERATURE: 98.5 F | HEART RATE: 83 BPM | SYSTOLIC BLOOD PRESSURE: 122 MMHG | DIASTOLIC BLOOD PRESSURE: 72 MMHG

## 2018-11-08 DIAGNOSIS — Z88.2: ICD-10-CM

## 2018-11-08 DIAGNOSIS — Z88.8: ICD-10-CM

## 2018-11-08 DIAGNOSIS — Z91.018: ICD-10-CM

## 2018-11-08 DIAGNOSIS — Z88.1: ICD-10-CM

## 2018-11-08 DIAGNOSIS — J03.90: Primary | ICD-10-CM

## 2018-11-08 PROCEDURE — 96372 THER/PROPH/DIAG INJ SC/IM: CPT

## 2018-11-08 PROCEDURE — 87430 STREP A AG IA: CPT

## 2018-11-08 PROCEDURE — 87081 CULTURE SCREEN ONLY: CPT

## 2018-11-08 PROCEDURE — 99283 EMERGENCY DEPT VISIT LOW MDM: CPT

## 2018-11-08 NOTE — ED
General Adult HPI





- General


Chief complaint: Allergic Reaction


Stated complaint: Alergic reaction


Time Seen by Provider: 11/08/18 02:31


Source: patient, family


Mode of arrival: ambulatory


Limitations: no limitations





- History of Present Illness


Initial comments: 


23-year-old female patient presents to the emergency department today with 

complaints of increasing sore throat.  Patient states he has been sick for the 

last week with upper respiratory symptoms including cough, nasal congestion, 

and sore throat.  Patient states [place] [facility] [place] 

acute bronchitis and started on amoxicillin and albuterol inhaler.  Patient 

states this evening her sore throat became worse.  Patient states it hurts 

worse on the right side.  States that she feels like her tongue is sore and 

swollen as well.  States she has taken 6 doses of amoxicillin and does not feel 

any better, she is concerned she may be having ALLERGIC reaction to the 

amoxicillin.  She denies any fevers or chills with this.  She denies any 

shortness of breath, rash, itching, or lip swelling.  She is 34 weeks pregnant, 

G1.  She reports normal fetal movement, no abdominal pain, no vaginal bleeding, 

or discharge.  Patient denies any recent rash, chest pain, nausea, vomiting, 

diarrhea, constipation, back pain, numbness, tingling, dizziness, weakness, 

hematuria, dysuria, urinary urgency, urinary frequency, headache, visual changes

, or any other complaints.








- Related Data


 Home Medications











 Medication  Instructions  Recorded  Confirmed


 


Pnv No.95/Ferrous Fum/Folic AC 1 each PO DAILY 11/06/18 11/08/18





[Prenatal Multivitamin Tablet]   








 Previous Rx's











 Medication  Instructions  Recorded


 


Albuterol Inhaler [Ventolin Hfa 1 - 2 puff INHALATION RT-Q6H PRN 11/06/18





Inhaler] #1 inhaler 


 


Amoxicillin 500 mg PO Q8H #21 capsule 11/06/18











 Allergies











Allergy/AdvReac Type Severity Reaction Status Date / Time


 


ceftriaxone [From Rocephin] Allergy  Rash/Hives Verified 11/08/18 02:23


 


soy Allergy  Swelling Verified 11/08/18 02:23


 


sulfamethoxazole Allergy  Rash/Hives Verified 11/08/18 02:23





[From Bactrim]     


 


sumatriptan [From Imitrex] Allergy  Anaphylaxis Verified 11/08/18 02:23


 


trimethoprim [From Bactrim] Allergy  Rash/Hives Verified 11/08/18 02:23














Review of Systems


ROS Statement: 


Those systems with pertinent positive or pertinent negative responses have been 

documented in the HPI.





ROS Other: All systems not noted in ROS Statement are negative.





Past Medical History


Past Medical History: No Reported History


Additional Past Medical History / Comment(s): hypothyroid


History of Any Multi-Drug Resistant Organisms: None Reported


Past Surgical History: No Surgical Hx Reported


Past Psychological History: Anxiety, Depression


Smoking Status: Never smoker


Past Alcohol Use History: Rare


Past Drug Use History: None Reported





General Exam


Limitations: no limitations


General appearance: alert, in no apparent distress, other (Physical well-

developed, well-nourished adult female patient in no acute distress.  Vital 

signs upon presentation are temperature 98.8F, pulse 92, respirations 20, 

blood pressure 117/76, pulse ox 98% on room air.)


Eye exam: Present: normal appearance, PERRL, EOMI.  Absent: scleral icterus, 

conjunctival injection, periorbital swelling


ENT exam: Present: normal exam, mucous membranes moist, TM's normal bilaterally

, other (No tonsillar exudate.).  Absent: normal oropharynx (Pharyngeal erythema

, tonsillar hypertrophy)


Neck exam: Present: normal inspection, lymphadenopathy (Left submandibular 

lymphadenopathy).  Absent: tenderness, meningismus


Respiratory exam: Present: normal lung sounds bilaterally.  Absent: respiratory 

distress, wheezes, rales, rhonchi, stridor


Cardiovascular Exam: Present: regular rate, normal rhythm, normal heart sounds.

  Absent: systolic murmur, diastolic murmur, rubs, gallop, clicks


GI/Abdominal exam: Present: soft, normal bowel sounds, other (Gravid abdomen).  

Absent: distended, tenderness, guarding, rebound, rigid


Neurological exam: Present: alert, oriented X3, CN II-XII intact


Psychiatric exam: Present: normal affect, normal mood


Skin exam: Present: warm, dry, intact, normal color.  Absent: rash





Course


 Vital Signs











  11/08/18





  02:19


 


Temperature 98.8 F


 


Pulse Rate 92


 


Respiratory 20





Rate 


 


Blood Pressure 117/76


 


O2 Sat by Pulse 98





Oximetry 














Medical Decision Making





- Medical Decision Making


23-year-old female patient presents the emergency department today for 

complaints of increasing sore throat.  Physical examination did reveal some 

submandibular lymphadenopathy.  Tonsils are erythematous and enlarged.  No 

exudate.  No evidence of.  Tonsillar abscess.  Patient was given Tylenol and 

injection of Decadron here in the department.  She is taking amoxicillin, she 

is urged to continue this.  She is instructed to follow-up with primary care 

physician for recheck in 1-2 days.  Return parameters discussed in detail.  She 

verbalizes understanding and agrees with this plan.








- Lab Data


 Lab Results











  11/08/18 Range/Units





  02:39 


 


Group A Strep Rapid  Negative  (Negative)  














Disposition


Clinical Impression: 


 Tonsillitis





Disposition: HOME SELF-CARE


Condition: Good


Instructions:  Tonsillitis (ED)


Additional Instructions: 


Continue antibiotics.  Continue taking Tylenol for pain control.  Increase 

fluids.  Do warm saltwater gargles.  Follow-up with your primary care physician 

for recheck in 1-2 days.  Return immediately for any new, worsening, or 

concerning symptoms.


Is patient prescribed a controlled substance at d/c from ED?: No


Referrals: 


None,Stated [Primary Care Provider] - 1-2 days


Time of Disposition: 03:28

## 2018-12-19 ENCOUNTER — HOSPITAL ENCOUNTER (OUTPATIENT)
Dept: HOSPITAL 47 - FBPOP | Age: 23
Discharge: HOME | End: 2018-12-19
Attending: OBSTETRICS & GYNECOLOGY
Payer: COMMERCIAL

## 2018-12-19 VITALS
HEART RATE: 98 BPM | TEMPERATURE: 97.6 F | SYSTOLIC BLOOD PRESSURE: 113 MMHG | RESPIRATION RATE: 16 BRPM | DIASTOLIC BLOOD PRESSURE: 61 MMHG

## 2018-12-19 DIAGNOSIS — O26.893: Primary | ICD-10-CM

## 2018-12-19 DIAGNOSIS — Z3A.40: ICD-10-CM

## 2018-12-19 DIAGNOSIS — R22.43: ICD-10-CM

## 2018-12-19 PROCEDURE — 76819 FETAL BIOPHYS PROFIL W/O NST: CPT

## 2018-12-20 NOTE — US
EXAMINATION TYPE: US OB fetal BPP wo non-stress

 

DATE OF EXAM: 2018

 

COMPARISON: NONE

 

CLINICAL HISTORY: variable declerations. Leg swelling. Limitations due to body habitus.

 

EXAM PERFORMED:  Transabdominal (TA)

 

BPP PARAMETERS:

 

FETAL PRESENTATION:    Vertex

HEART RATE:   142 bpm 

RHYTHM: Normal

CIRO:   9.89 cm

 

DIAPHRAGM IMAGED: YES

 

BPP SCORIN.  Breathin

(1 episode of fetal breathing of 30 second duration in 30 minutes of scanning time)

2.  Movement:  2

(at least 3 discrete body movements in 30 minutes)

3.  Tone:  2

(1 episode of active flexion/extension of limb)

4.  CIRO: 2

(CIRO index > 5cm)

 

*** TOTAL SCORE:   8  / 8

 

Impression

 

Normal biophysical profile. Amniotic fluid index is normal.

## 2018-12-21 ENCOUNTER — HOSPITAL ENCOUNTER (INPATIENT)
Dept: HOSPITAL 47 - 4FBP | Age: 23
LOS: 2 days | Discharge: HOME | End: 2018-12-23
Attending: OBSTETRICS & GYNECOLOGY | Admitting: OBSTETRICS & GYNECOLOGY
Payer: COMMERCIAL

## 2018-12-21 VITALS — BODY MASS INDEX: 39.6 KG/M2

## 2018-12-21 DIAGNOSIS — Z88.1: ICD-10-CM

## 2018-12-21 DIAGNOSIS — O48.0: Primary | ICD-10-CM

## 2018-12-21 DIAGNOSIS — Z88.8: ICD-10-CM

## 2018-12-21 DIAGNOSIS — Z79.899: ICD-10-CM

## 2018-12-21 DIAGNOSIS — J45.909: ICD-10-CM

## 2018-12-21 DIAGNOSIS — Z88.2: ICD-10-CM

## 2018-12-21 DIAGNOSIS — Z91.018: ICD-10-CM

## 2018-12-21 DIAGNOSIS — Z3A.40: ICD-10-CM

## 2018-12-21 LAB
BASOPHILS # BLD AUTO: 0 K/UL (ref 0–0.2)
BASOPHILS NFR BLD AUTO: 0 %
EOSINOPHIL # BLD AUTO: 0.1 K/UL (ref 0–0.7)
EOSINOPHIL NFR BLD AUTO: 2 %
ERYTHROCYTE [DISTWIDTH] IN BLOOD BY AUTOMATED COUNT: 4.11 M/UL (ref 3.8–5.4)
ERYTHROCYTE [DISTWIDTH] IN BLOOD: 15.1 % (ref 11.5–15.5)
HCT VFR BLD AUTO: 31.5 % (ref 34–46)
HGB BLD-MCNC: 10 GM/DL (ref 11.4–16)
LYMPHOCYTES # SPEC AUTO: 1.8 K/UL (ref 1–4.8)
LYMPHOCYTES NFR SPEC AUTO: 23 %
MCH RBC QN AUTO: 24.4 PG (ref 25–35)
MCHC RBC AUTO-ENTMCNC: 31.8 G/DL (ref 31–37)
MCV RBC AUTO: 76.7 FL (ref 80–100)
MONOCYTES # BLD AUTO: 0.4 K/UL (ref 0–1)
MONOCYTES NFR BLD AUTO: 5 %
NEUTROPHILS # BLD AUTO: 5.4 K/UL (ref 1.3–7.7)
NEUTROPHILS NFR BLD AUTO: 68 %
PLATELET # BLD AUTO: 318 K/UL (ref 150–450)
WBC # BLD AUTO: 8 K/UL (ref 3.8–10.6)

## 2018-12-21 PROCEDURE — 86900 BLOOD TYPING SEROLOGIC ABO: CPT

## 2018-12-21 PROCEDURE — 88307 TISSUE EXAM BY PATHOLOGIST: CPT

## 2018-12-21 PROCEDURE — 10907ZC DRAINAGE OF AMNIOTIC FLUID, THERAPEUTIC FROM PRODUCTS OF CONCEPTION, VIA NATURAL OR ARTIFICIAL OPENING: ICD-10-PCS

## 2018-12-21 PROCEDURE — 00HU33Z INSERTION OF INFUSION DEVICE INTO SPINAL CANAL, PERCUTANEOUS APPROACH: ICD-10-PCS

## 2018-12-21 PROCEDURE — 3E033VJ INTRODUCTION OF OTHER HORMONE INTO PERIPHERAL VEIN, PERCUTANEOUS APPROACH: ICD-10-PCS

## 2018-12-21 PROCEDURE — 85025 COMPLETE CBC W/AUTO DIFF WBC: CPT

## 2018-12-21 PROCEDURE — 86850 RBC ANTIBODY SCREEN: CPT

## 2018-12-21 PROCEDURE — 86901 BLOOD TYPING SEROLOGIC RH(D): CPT

## 2018-12-21 PROCEDURE — 3E0R3NZ INTRODUCTION OF ANALGESICS, HYPNOTICS, SEDATIVES INTO SPINAL CANAL, PERCUTANEOUS APPROACH: ICD-10-PCS

## 2018-12-21 RX ADMIN — POTASSIUM CHLORIDE SCH MLS/HR: 14.9 INJECTION, SOLUTION INTRAVENOUS at 15:35

## 2018-12-21 RX ADMIN — POTASSIUM CHLORIDE SCH MLS/HR: 14.9 INJECTION, SOLUTION INTRAVENOUS at 07:01

## 2018-12-21 RX ADMIN — POTASSIUM CHLORIDE SCH MLS/HR: 14.9 INJECTION, SOLUTION INTRAVENOUS at 13:55

## 2018-12-21 RX ADMIN — FAMOTIDINE SCH MG: 10 INJECTION, SOLUTION INTRAVENOUS at 10:00

## 2018-12-21 NOTE — P.OP
Date of Procedure: 18


Preoperative Diagnosis: 


#1.  40-2/7 weeks, induction #2.  Arrest of dilation and descent


Postoperative Diagnosis: 


Same plus #3.  Occiput posterior position #4.  Nuchal cord 1


Procedure(s) Performed: 


#1.  Primary low-transverse  section


Anesthesia: MODESTO


Surgeon: Gonzalez Wagner


Assistant #1: Otilia Galvez


Estimated Blood Loss (ml): 600


IV fluids (ml): 1,000


Urine output (ml): 100


Pathology: other (Placenta)


Condition: stable


Disposition: floor


Operative Findings: 


Preoperatively, the patient had been on Pitocin all day long and had dilated to 

approximate 6 cm by approximately 1600.  She remained at that dilation with a 

high station until approximately  at which time the decision was made to 

proceed to the operating room for primary low-transverse  section for 

arrest of dilation and descent.  There was significant fetal At present as 

well.  There was also suspicion of occiput posterior positioning.  She was 

taken the operating room where she was delivered of a viable 8 lbs. 5 oz. baby 

girl with Apgars of 4 at 1 minute 8 at 5 minutes and 9 at 10 minutes in the 

right occiput posterior position.  There was a loose nuchal cord 1 which was 

reduced prior to delivery of the fetal body.  The placenta was delivered 

manually, intact, and grossly normal with a grossly normal three-vessel cord.  

The uterus, tubes, and ovaries were entirely normal to inspection.


Description of Procedure: 


The patient was prepped and draped in usual fashion after epidural anesthesia 

was bolused by the anesthesiologist.  The level never norbert to an adequate level 

for surgery and general endotracheal anesthesia was instituted.  Following 

placement of the endotracheal tube, a Pfannenstiel incision was made and 

extended into the abdominal cavity without difficulty.  The bladder peritoneum 

was far distal to the intended site of incision and was left intact.  A 2 cm 

incision was made in the transverse plane of the lower uterine segment to enter 

the uterus at which time clear fluid was again noted.  The incision was 

extended in both directions using the bandage scissors.  The fetal head was 

encountered deep within the pelvis in the occiput posterior position as noted 

above.  It was elevated up and through the incision where the nose and mouth 

were thoroughly suctioned.  The remainder of the infant was delivered onto the 

field after the nuchal cord had been reduced.  The cord was doubly clamped, cut

, and the infant passed for resuscitative measures with weight and Apgars as 

noted above.  A segment of cord was doubly clamped, cut, and set aside for cord 

gases which weren't sent to the lab for evaluation.  The placenta was delivered 

manually and intact as noted above.  The uterus was exteriorized and the 

interior cavity of the uterus swept of any remaining placental or membranous 

fragments.  The margins of the incision were grasped with Ngo clamps and 

the incision was closed in 2 layers.  The first layer was a running locking 

stitch of 0 chromic catgut followed by a running imbricating layer of 0 chromic 

catgut, each from margin to margin.  The posterior cul-de-sac was then 

suctioned using a guard.  The uterine and ovarian findings were normal as noted 

above.  The uterus was replaced within the abdominal cavity and the gutters 

were swept of any remaining blood, fluid, or clot.  Any small points of 

bleeding in and around the incision were made hemostatic with the Bovie.  After 

adequate hemostasis was established, the parietal peritoneum was loosely 

reapproximated and layer of muscles examined and found to be hemostatic.  The 

fascia was closed with 2 running stitches of 0 Vicryl proceeding from the 

lateral margins to the midpoint.  Subcutaneous tissues were irrigated, made 

hemostatic with the Bovie, and reapproximated with a running stitch of 30 plain 

catgut.  The skin was reapproximated with a running subcuticular stitch of 4-0 

Vicryl followed by placement of half-inch Steri-Strips with Mastisol.  

Estimated blood loss for the case was approximately 600 mL.  There were no 

complications.  All sponge, instrument, and needle counts were correct.  The 

patient tolerated the procedure well and proceeded to the recovery room in 

stable condition.  Both mother and infant are resting comfortably in recovery.

## 2018-12-21 NOTE — P.HPOB
History of Present Illness


H&P Date: 18


Chief Complaint: 40-2/7 weeks, induction





The patient is a 23-year-old  1 para 0 admitted at 40-2/7 weeks as 

established by last menstrual period and confirmed by 28 week ultrasound.  She 

is admitted for postdates induction of labor with all signs reassuring.  Her 

pregnancy has been essentially uncomplicated though she did present to our 

office relatively late and with minimal prenatal care.  On admission, all signs 

reassuring.  Group B strep status is negative.





Obstetrical history:  1 para 0 with current pregnancy statistics listed 

in history present illness.  EDC of 2018 was established by last 

menstrual period and confirmed by 28 week ultrasound.  Laboratory workup 

demonstrates a blood type of A+ with a negative antibody screen.  Rubella 

status is immune.  The remainder of the laboratory workup was within normal 

limits.  One hour Glucola was normal and group B strep status is negative.





Gynecologic history: Unremarkable with no history of any infections to include 

STDs.





Review of Systems





Review of systems is confined to history of present illness.





Past Medical History


Past Medical History: Asthma


Additional Past Medical History / Comment(s): hypothyroid


History of Any Multi-Drug Resistant Organisms: None Reported


Past Surgical History: No Surgical Hx Reported


Past Anesthesia/Blood Transfusion Reactions: No Reported Reaction


Past Psychological History: Anxiety, Depression


Smoking Status: Never smoker


Past Alcohol Use History: Rare


Past Drug Use History: None Reported





- Past Family History


  ** Mother


Family Medical History: Cancer





Medications and Allergies


 Home Medications











 Medication  Instructions  Recorded  Confirmed  Type


 


Albuterol Inhaler [Ventolin Hfa 1 - 2 puff INHALATION RT-Q6H PRN 18 Rx





Inhaler] #1 inhaler   


 


Pnv No.95/Ferrous Fum/Folic AC 1 each PO DAILY 18 History





[Prenatal Multivitamin Tablet]    











 Allergies











Allergy/AdvReac Type Severity Reaction Status Date / Time


 


ceftriaxone [From Rocephin] Allergy  Rash/Hives Verified 18 06:40


 


soy Allergy  Swelling Verified 18 06:40


 


sulfamethoxazole Allergy  Rash/Hives Verified 18 06:40





[From Bactrim]     


 


sumatriptan [From Imitrex] Allergy  Anaphylaxis Verified 18 06:40


 


trimethoprim [From Bactrim] Allergy  Rash/Hives Verified 18 06:40














Exam


 Vital Signs











  Temp Pulse Resp BP Pulse Ox


 


 18 06:40  97 F L  98  16  124/88  97








 Intake and Output











 18





 22:59 06:59 14:59


 


Other:   


 


  Weight  101.605 kg 














In general, this is a well-developed, well-nourished white female in no acute 

distress.  Her heart has a regular rhythm and rate without murmur.  Her lungs 

are clear to auscultation bilaterally in all fields.  Her abdomen is gravid, 

nondistended, has normal active bowel sounds, soft, nontender, and without any 

palpable masses aside from uterine fundus.  Her extremities are without any 

cyanosis, clubbing, or significant edema and are nontender to palpation 

bilaterally.  Digital cervical examination demonstrates her cervix to be 2-3 cm 

dilated, 50% effaced, the vertex in presentation at -2 station.





Results


Result Diagrams: 


 18 07:00





 Abnormal Lab Results - Last 24 Hours (Table)











  18 Range/Units





  07:00 


 


Hgb  10.0 L  (11.4-16.0)  gm/dL


 


Hct  31.5 L  (34.0-46.0)  %


 


MCV  76.7 L  (80.0-100.0)  fL


 


MCH  24.4 L  (25.0-35.0)  pg














Assessment and Plan


(1) Term pregnancy


Current Visit: No   Status: Acute   Code(s): Z34.80 - ENCOUNTER FOR SUPRVSN OF 

NORMAL PREGNANCY, UNSP TRIMESTER   SNOMED Code(s): 95043636


   


Plan: 





The patient is admitted for a postdates induction of labor.  Pitocin 

augmentation has been started and she has undergone artificial rupture of 

membranes.  She will continue to have close maternal and fetal surveillance and 

expectant management will be practiced.  She is a good candidate for either IV 

or epidural analgesia, whichever she may choose.

## 2018-12-22 VITALS — RESPIRATION RATE: 16 BRPM

## 2018-12-22 LAB
BASOPHILS # BLD AUTO: 0 K/UL (ref 0–0.2)
BASOPHILS NFR BLD AUTO: 0 %
EOSINOPHIL # BLD AUTO: 0.1 K/UL (ref 0–0.7)
EOSINOPHIL NFR BLD AUTO: 1 %
ERYTHROCYTE [DISTWIDTH] IN BLOOD BY AUTOMATED COUNT: 3.21 M/UL (ref 3.8–5.4)
ERYTHROCYTE [DISTWIDTH] IN BLOOD: 15 % (ref 11.5–15.5)
GLUCOSE BLD-MCNC: 97 MG/DL (ref 75–99)
HCT VFR BLD AUTO: 24.8 % (ref 34–46)
HGB BLD-MCNC: 7.8 GM/DL (ref 11.4–16)
LYMPHOCYTES # SPEC AUTO: 1.4 K/UL (ref 1–4.8)
LYMPHOCYTES NFR SPEC AUTO: 12 %
MCH RBC QN AUTO: 24.5 PG (ref 25–35)
MCHC RBC AUTO-ENTMCNC: 31.7 G/DL (ref 31–37)
MCV RBC AUTO: 77.1 FL (ref 80–100)
MONOCYTES # BLD AUTO: 0.3 K/UL (ref 0–1)
MONOCYTES NFR BLD AUTO: 3 %
NEUTROPHILS # BLD AUTO: 9.1 K/UL (ref 1.3–7.7)
NEUTROPHILS NFR BLD AUTO: 82 %
PLATELET # BLD AUTO: 238 K/UL (ref 150–450)
WBC # BLD AUTO: 11.1 K/UL (ref 3.8–10.6)

## 2018-12-22 RX ADMIN — KETOROLAC TROMETHAMINE PRN MG: 30 INJECTION, SOLUTION INTRAMUSCULAR at 00:47

## 2018-12-22 RX ADMIN — KETOROLAC TROMETHAMINE PRN MG: 30 INJECTION, SOLUTION INTRAMUSCULAR at 08:01

## 2018-12-22 RX ADMIN — DOCUSATE SODIUM AND SENNOSIDES SCH: 50; 8.6 TABLET ORAL at 21:21

## 2018-12-22 RX ADMIN — HYDROCODONE BITARTRATE AND ACETAMINOPHEN PRN EACH: 7.5; 325 TABLET ORAL at 23:06

## 2018-12-22 RX ADMIN — FAMOTIDINE SCH: 10 INJECTION, SOLUTION INTRAVENOUS at 03:34

## 2018-12-22 RX ADMIN — IBUPROFEN PRN MG: 600 TABLET ORAL at 19:47

## 2018-12-22 RX ADMIN — HYDROCODONE BITARTRATE AND ACETAMINOPHEN PRN EACH: 5; 325 TABLET ORAL at 10:00

## 2018-12-22 RX ADMIN — HYDROCODONE BITARTRATE AND ACETAMINOPHEN PRN EACH: 5; 325 TABLET ORAL at 16:58

## 2018-12-22 RX ADMIN — POTASSIUM CHLORIDE SCH MLS/HR: 14.9 INJECTION, SOLUTION INTRAVENOUS at 03:50

## 2018-12-22 RX ADMIN — POTASSIUM CHLORIDE SCH: 14.9 INJECTION, SOLUTION INTRAVENOUS at 21:20

## 2018-12-22 RX ADMIN — KETOROLAC TROMETHAMINE PRN MG: 30 INJECTION, SOLUTION INTRAMUSCULAR at 14:06

## 2018-12-22 RX ADMIN — FAMOTIDINE SCH MG: 10 INJECTION, SOLUTION INTRAVENOUS at 09:59

## 2018-12-22 RX ADMIN — DOCUSATE SODIUM AND SENNOSIDES SCH EACH: 50; 8.6 TABLET ORAL at 08:03

## 2018-12-23 VITALS — DIASTOLIC BLOOD PRESSURE: 94 MMHG | TEMPERATURE: 97.5 F | HEART RATE: 86 BPM | SYSTOLIC BLOOD PRESSURE: 137 MMHG

## 2018-12-23 RX ADMIN — IBUPROFEN PRN MG: 600 TABLET ORAL at 02:15

## 2018-12-23 RX ADMIN — HYDROCODONE BITARTRATE AND ACETAMINOPHEN PRN EACH: 7.5; 325 TABLET ORAL at 04:57

## 2018-12-23 RX ADMIN — IBUPROFEN PRN MG: 600 TABLET ORAL at 07:50

## 2018-12-23 RX ADMIN — DOCUSATE SODIUM AND SENNOSIDES SCH: 50; 8.6 TABLET ORAL at 07:59

## 2018-12-23 NOTE — P.DS
Providers


Date of admission: 


18 06:37





Expected date of discharge: 18


Attending physician: 


Gonzalez Wagner





Primary care physician: 


Stated None








- Discharge Diagnosis(es)


(1) Term pregnancy


Current Visit: No   Status: Acute   





(2) S/P  section


Current Visit: No   Status: Acute   


Hospital Course: 





The patient is a 23-year-old  1 para 0 admitted at 40-2/7 weeks by 

average dating parameters.  She is admitted for postdates induction of labor 

with all signs reassuring.  Her pregnancy was uncomplicated though she had late 

care.  Group B strep status is negative.  On labor and delivery, she had 

Pitocin augmentation started and underwent artificial rupture of membranes for 

clear fluid.  She made very slow progress throughout the day and had an 

epidural catheter placed for analgesia.  She arrested dilation and descent at 

approximately 6 cm and -2 station and was thought to have the fetus in the 

occiput posterior position.  After approximately 5-6 hours at this dilation and 

station, she was taken the operating room where she was delivered of a viable 8 

lbs. 5 oz. baby girl with Apgars of 4 at 1 minute, 8 at 5 minutes, and 9 at 10 

minutes.  She did experience an unusual reaction in the immediate postoperative 

recovery.  With some lateral twitching of her head and generalized twitching of 

extremities of though she was absolutely responsive during the entire process.  

Vital signs remained stable as well and O2 saturation was normal throughout.  

This resolved approximately 40 minutes following surgery and, since that time, 

recovery and postoperative course have been entirely unremarkable.  She was 

tolerating regular diet by the middle of the day of postoperative day #1.  She 

was deemed stable for discharge on postoperative day #2 was discharged home to 

follow-up in the office in 2 weeks for an incision check and 6 weeks routinely.

  Discharge instructions included calling for any significantly increased 

bleeding or foul-smelling lochia, significantly increased fever abdominal pain, 

perineal complaints, breast complaints, incisional complaints, or anything else 

that concerned her.  She was additionally instructed to have nothing in the 

vagina for at least 6 weeks time to include intercourse and to abstain from any 

heavy lifting over the same period of time.  She was lastly instructed to do no 

driving until off of all pain medications or 2 weeks' time, whichever came 

first.  She understood her instructions and agrees to follow up as noted above.

  Discharge medications included continued prenatal vitamins as she has opted 

to breast-feed.  She additionally was provided with a prescription for Norco 5/

325 mg, 1-2 by mouth every 6 hours when necessary pain, #20 dispensed with no 

refills.  Maternal blood type is A+ and rubella status is immune.  Discharge 

hemoglobin and hematocrit were 7.8 and 24.8 respectively and, as a result, she 

was asked to continue with iron sulfate 325 mg daily for at least 1-2 months.


Procedures: 





#1.  Pitocin induction and 2.  Artificial rupture of membranes #3.  Epidural 

analgesia 4.  Primary low-transverse  section


Patient Condition at Discharge: Good





Plan - Discharge Summary


New Discharge Prescriptions: 


No Action


   Pnv No.95/Ferrous Fum/Folic AC [Prenatal Multivitamin Tablet] 1 each PO DAILY


   Albuterol Inhaler [Ventolin Hfa Inhaler] 1 - 2 puff INHALATION RT-Q6H PRN #1 

inhaler


     PRN Reason: Shortness Of Breath


Discharge Medication List





Albuterol Inhaler [Ventolin Hfa Inhaler] 1 - 2 puff INHALATION RT-Q6H PRN #1 

inhaler 18 [Rx]


Pnv No.95/Ferrous Fum/Folic AC [Prenatal Multivitamin Tablet] 1 each PO DAILY  [History]








Follow up Appointment(s)/Referral(s): 


Gonzalez Wagner MD [STAFF PHYSICIAN] - 2 Weeks


Discharge Disposition: HOME SELF-CARE

## 2019-01-10 NOTE — P.MSEPDOC
Presenting Problems





- Arrival Data


Date of Arrival on Unit: 18


Time of Arrival on Unit: 22:17


Mode of Transport: Ambulatory





- Complaint


OB-Reason for Admission/Chief Complaint: Other


Comment: b/l foot swelling and mid lower back swelling per pt.





Prenatal Medical History





- Pregnancy Information


: 1


Para: 0


Term: 0


: 0


Abortions: Spontaneous or Elective: 0


Number of Living Children: 0





- Gestational Age


Gestational Age by JEREMIAH (wks/days): 40 Weeks and 0 Days





Review of Systems





- Review of Systems


Constitutional: No problems


Breast: No problems


ENT: No problems


Cardiovascular: No problems


Respiratory: No problems


Gastrointestinal: No problems


Genitourinary: No problems


Musculoskeletal: No problems


Neurological: No problems


Skin: No problems





Vital Signs





- Temperature


Temperature: 97.6 F


Temperature Source: Oral





- Pulse


  ** Right Sitting Brachial


Pulse Rate: 98


Pulse Assessment Method: Automatic Cuff





- Respirations


Respiratory Rate: 16


Oxygen Delivery Method: Room Air


O2 Sat by Pulse Oximetry: 98





- Blood Pressure


  ** Right Arm Sitting


Blood Pressure: 113/61


Blood Pressure Mean: 78


Blood Pressure Source: Automatic Cuff





Medical Screen Scoring (Pre)





- Cervical Exam


Dilation: Exam Deferred


Effacement: Exam Deferred


Membranes: Intact





- Uterine Contractions


Frequency: N/A


Duration: N/A


Intensity: N/A





- Maternal Vital Signs


Maternal Temperature: N/A


Signs of Preeclampsia: N/A


Maternal Respirations: N/A





- Pain Assessment


Pain Scale Used: Numeric (1 - 10)


Pain Intensity: 0





- Maternal Trauma


Maternal Trauma: N/A





- Fetal Assessment


Baseline FHR: 145


Fetal Heart Rate - NICHD Category: Category II (Indeterminate) = 3


NST: Non-reactive = 3


Fetal Position: N/A


Fetal Station: N/A





- Total Score


Total Score (Pre): 6





- Level of Risk


Level of Risk: Medium (6-9)





Physician Notification (Pre)





- Physician Notified


Physician Notified Date: 18


Physician Notified Time: 23:10


Physician/Practitioner Notifed:: niko


Spoke With: niko


New Order Received: Yes





- Notification Comment


Comment: keep on monitor for another 30 mins and change pt's position often. 

Call if any decels.


I agree with the RN Medical Screening Exam: No


Physician's MSE Comment: 





inadequate documentation


Risk & Benefit of care provided described in d/c instruction: No


Diagnosis: 40 WEEKS GESTATION OF PREGNANCY

## 2019-07-22 ENCOUNTER — HOSPITAL ENCOUNTER (EMERGENCY)
Dept: HOSPITAL 47 - EC | Age: 24
Discharge: HOME | End: 2019-07-22
Payer: COMMERCIAL

## 2019-07-22 VITALS — HEART RATE: 80 BPM | SYSTOLIC BLOOD PRESSURE: 132 MMHG | TEMPERATURE: 98 F | DIASTOLIC BLOOD PRESSURE: 95 MMHG

## 2019-07-22 VITALS — RESPIRATION RATE: 17 BRPM

## 2019-07-22 DIAGNOSIS — G89.29: Primary | ICD-10-CM

## 2019-07-22 DIAGNOSIS — M25.471: ICD-10-CM

## 2019-07-22 DIAGNOSIS — Z88.2: ICD-10-CM

## 2019-07-22 DIAGNOSIS — Z91.018: ICD-10-CM

## 2019-07-22 DIAGNOSIS — Z88.1: ICD-10-CM

## 2019-07-22 DIAGNOSIS — M25.571: ICD-10-CM

## 2019-07-22 PROCEDURE — 99283 EMERGENCY DEPT VISIT LOW MDM: CPT

## 2019-07-22 PROCEDURE — 73610 X-RAY EXAM OF ANKLE: CPT

## 2019-07-22 PROCEDURE — 29515 APPLICATION SHORT LEG SPLINT: CPT

## 2019-07-22 NOTE — XR
EXAM:

  XR Right Ankle Complete, 3 Views

 

CLINICAL HISTORY:

  ITS.REASON XR Reason: Pain

 

TECHNIQUE:

  Frontal, lateral and oblique views of the right ankle.

 

COMPARISON:

  No relevant prior studies available.

 

FINDINGS:

  Bones/joints:  Unremarkable.  No acute fracture.  No dislocation.

  Soft tissues:  Soft tissue swelling.

 

IMPRESSION:     

  No acute fracture.

## 2019-07-22 NOTE — ED
Extremity Problem HPI





- General


Chief complaint: Extremity Problem,Nontraumatic


Stated complaint: Rt ankle pain


Source: patient


Mode of arrival: ambulatory


Limitations: no limitations





- History of Present Illness


Initial comments: 


Katerin is a 23-year-old female who presents the emergency department today for

evaluation of right ankle pain.  Patient reports that the pain began in the end 

of January when she had a slip and fall on the ice.  Patient reports that since 

that time she has recurrently rolled her ankle and been experiencing pain in her

right ankle most prominent on the lateral side.  She is also noted swelling.  

She has seen her primary care physician about this who advised her she has a 

sprain and to Ace wrap it.  Patient reports that the Ace wrap isn't helping.  He

denies any acute injuries today.








- Related Data


                                Home Medications











 Medication  Instructions  Recorded  Confirmed


 


Pnv No.95/Ferrous Fum/Folic AC 1 each PO DAILY 11/06/18 12/21/18





[Prenatal Multivitamin Tablet]   








                                  Previous Rx's











 Medication  Instructions  Recorded


 


Albuterol Inhaler [Ventolin Hfa 1 - 2 puff INHALATION RT-Q6H PRN 11/06/18





Inhaler] #1 inhaler 











                                    Allergies











Allergy/AdvReac Type Severity Reaction Status Date / Time


 


ceftriaxone [From Rocephin] Allergy  Rash/Hives Verified 12/21/18 06:40


 


soy Allergy  Swelling Verified 12/21/18 06:40


 


sulfamethoxazole Allergy  Rash/Hives Verified 12/21/18 06:40





[From Bactrim]     


 


sumatriptan [From Imitrex] Allergy  Anaphylaxis Verified 12/21/18 06:40


 


trimethoprim [From Bactrim] Allergy  Rash/Hives Verified 12/21/18 06:40














Review of Systems


ROS Statement: 


Those systems with pertinent positive or pertinent negative responses have been 

documented in the HPI.





ROS Other: All systems not noted in ROS Statement are negative.





Past Medical History


Past Medical History: Asthma


Additional Past Medical History / Comment(s): hypothyroid


History of Any Multi-Drug Resistant Organisms: None Reported


Past Surgical History: No Surgical Hx Reported


Past Anesthesia/Blood Transfusion Reactions: No Reported Reaction


Past Psychological History: Anxiety, Depression


Smoking Status: Never smoker


Past Alcohol Use History: Rare


Past Drug Use History: None Reported





- Past Family History


  ** Mother


Family Medical History: Cancer





General Exam





- General Exam Comments


Initial Comments: 


Physical Exam


GENERAL:


Patient is well-developed and well-nourished.  


Patient is nontoxic and well-hydrated and is in no distress.





HENT:


Normocephalic, Atraumatic. 





EYES:


EOMI





PULMONARY:


Unlabored respirations





CARDIOVASCULAR:


Regular rate and rhythm


Strong DP and PT pulses in the right lower extremity





ABDOMEN:


Obese





SKIN:


Skin is clear with no lesions or rashes and otherwise unremarkable.





: 


Deferred





NEUROLOGIC:


Patient is alert and oriented x3.  Moving all extremities spontaneously





MUSCULOSKELETAL:


Swelling to the right lateral ankle 


No obvious deformity 


No joint instability noted 





PSYCHIATRIC:


Normal psychiatric evaluation





Limitations: no limitations





Course


                                   Vital Signs











  07/22/19 07/22/19





  02:20 04:06


 


Temperature 98.2 F 98.0 F


 


Pulse Rate 83 80


 


Respiratory 17 17





Rate  


 


Blood Pressure 141/82 132/95


 


O2 Sat by Pulse 99 97





Oximetry  














Medical Decision Making





- Medical Decision Making


The patient was seen and evaluated, an x-ray was obtained in triage





X-ray with no acute findings


Patient will be placed in a air cast for support and discharged home.  Patient 

will be referred to orthopedics for evaluation of chronic ankle pain after an 

apparent sprain.  Patient was advised to wear more supportive footwear, continue

rest, ice, compression and elevation until follow-up with orthopedics.








Disposition


Clinical Impression: 


 Chronic pain of right ankle





Disposition: HOME SELF-CARE


Condition: Stable


Instructions (If sedation given, give patient instructions):  Arthralgia (ED)


Is patient prescribed a controlled substance at d/c from ED?: No


Referrals: 


Tana Xavier MD [Primary Care Provider] - 1-2 days


[facility] [Provider Group] - 1-2 days

## 2019-10-28 ENCOUNTER — HOSPITAL ENCOUNTER (EMERGENCY)
Dept: HOSPITAL 47 - EC | Age: 24
Discharge: HOME | End: 2019-10-28
Payer: COMMERCIAL

## 2019-10-28 VITALS
DIASTOLIC BLOOD PRESSURE: 77 MMHG | TEMPERATURE: 97.9 F | SYSTOLIC BLOOD PRESSURE: 126 MMHG | RESPIRATION RATE: 17 BRPM | HEART RATE: 70 BPM

## 2019-10-28 DIAGNOSIS — Z91.018: ICD-10-CM

## 2019-10-28 DIAGNOSIS — Z88.1: ICD-10-CM

## 2019-10-28 DIAGNOSIS — Z88.2: ICD-10-CM

## 2019-10-28 DIAGNOSIS — J45.998: Primary | ICD-10-CM

## 2019-10-28 PROCEDURE — 71046 X-RAY EXAM CHEST 2 VIEWS: CPT

## 2019-10-28 PROCEDURE — 99285 EMERGENCY DEPT VISIT HI MDM: CPT

## 2019-10-28 PROCEDURE — 94640 AIRWAY INHALATION TREATMENT: CPT

## 2019-10-28 NOTE — XR
EXAMINATION TYPE: XR chest 2V

 

DATE OF EXAM: 10/28/2019

 

COMPARISON: NONE

 

TECHNIQUE: PA and lateral views submitted.

 

HISTORY: Cough

 

FINDINGS:

The lungs are clear and  there is no pneumothorax, pleural effusion, or focal pneumonia.  No overt fa
ilure.

 

IMPRESSION: 

1. No acute process.

## 2019-10-28 NOTE — ED
SOB HPI





- General


Chief Complaint: Shortness of Breath


Stated Complaint: Pneumonia


Time Seen by Provider: 10/28/19 13:59


Source: patient, RN notes reviewed


Mode of arrival: ambulatory


Limitations: no limitations





- History of Present Illness


Initial Comments: 





23-year-old female presents emergency Department chief complaint of cough 

congestion.  Patient states she started with a cold almost 2 weeks ago.  Patient

was treated with azithromycin and a Medrol Dosepak states that she finishes over

a week ago states that she still remains very sick.  Patient states that she has

underlying asthma.  She does not currently see a pulmonologist.  Denies any 

known fever states that she's had hot and cold chills.  Patient denies any chest

pain, headache or dizziness she has mild nasal congestion no prescription 

medications other than inhalers.





- Related Data


                                  Previous Rx's











 Medication  Instructions  Recorded


 


Amoxicillin/Potassium Clav 1 tab PO Q12HR #20 tab 10/28/19





[Augmentin 875-125 Tablet]  


 


Ipratropium-Albuterol Nebulize 3 ml INHALATION QID #1 box 10/28/19





[Duoneb 0.5 mg-3 mg/3 ml Soln]  


 


predniSONE 50 mg PO DAILY #5 tab 10/28/19











                                    Allergies











Allergy/AdvReac Type Severity Reaction Status Date / Time


 


ceftriaxone [From Rocephin] Allergy  Rash/Hives Verified 10/28/19 14:16


 


soy Allergy  Swelling Verified 10/28/19 14:16


 


sulfamethoxazole Allergy  Rash/Hives/ Verified 10/28/19 14:16





[From Bactrim]   fever  


 


sumatriptan [From Imitrex] Allergy  Anaphylaxis Verified 10/28/19 14:16


 


trimethoprim [From Bactrim] Allergy  Rash/Hives/ Verified 10/28/19 14:16





   fever  














Review of Systems


ROS Statement: 


Those systems with pertinent positive or pertinent negative responses have been 

documented in the HPI.





ROS Other: All systems not noted in ROS Statement are negative.





Past Medical History


Past Medical History: Asthma, Pneumonia, Thyroid Disorder


Additional Past Medical History / Comment(s): hypothyroid


History of Any Multi-Drug Resistant Organisms: None Reported


Past Surgical History: No Surgical Hx Reported


Past Anesthesia/Blood Transfusion Reactions: No Reported Reaction


Past Psychological History: Anxiety, Depression


Smoking Status: Never smoker


Past Alcohol Use History: Rare


Past Drug Use History: None Reported





- Past Family History


  ** Mother


Family Medical History: Cancer





General Exam


Limitations: no limitations


General appearance: alert, in no apparent distress


Head exam: Present: atraumatic, normocephalic, normal inspection


Eye exam: Present: normal appearance, PERRL, EOMI.  Absent: scleral icterus, 

conjunctival injection, periorbital swelling


ENT exam: Present: normal exam, normal oropharynx, mucous membranes moist, TM's 

normal bilaterally, normal external ear exam


Neck exam: Present: normal inspection, full ROM.  Absent: tenderness, 

meningismus, lymphadenopathy


Respiratory exam: Present: wheezes, decreased breath sounds.  Absent: normal 

lung sounds bilaterally, respiratory distress, rales, rhonchi, stridor


Cardiovascular Exam: Present: regular rate, normal rhythm, normal heart sounds. 

Absent: systolic murmur, diastolic murmur, rubs, gallop, clicks


Neurological exam: Present: alert, oriented X3


Skin exam: Present: warm, dry, intact, normal color.  Absent: rash





Course


                                   Vital Signs











  10/28/19





  13:53


 


Temperature 98.0 F


 


Pulse Rate 97


 


Respiratory 20





Rate 


 


Blood Pressure 133/94


 


O2 Sat by Pulse 99





Oximetry 














Medical Decision Making





- Medical Decision Making





Chest x-ray shows no acute process.  Patient's symptoms are consistent with 

asthmatic bronchitis.  Patient states that she's been having ongoing issues with

her asthma and shortness of breath and states that she does not currently see a 

pulmonologist.  Patient will be recommended follow-up with pulmonology.





Disposition


Clinical Impression: 


 Asthmatic bronchitis





Disposition: HOME SELF-CARE


Condition: Stable


Instructions (If sedation given, give patient instructions):  Acute Bronchitis 

(ED), Asthma (ED)


Additional Instructions: 


Please return to the Emergency Department if symptoms worsen or any other 

concerns.


Prescriptions: 


Amoxicillin/Potassium Clav [Augmentin 875-125 Tablet] 1 tab PO Q12HR #20 tab


Ipratropium-Albuterol Nebulize [Duoneb 0.5 mg-3 mg/3 ml Soln] 3 ml INHALATION 

QID #1 box


predniSONE 50 mg PO DAILY #5 tab


Is patient prescribed a controlled substance at d/c from ED?: No


Referrals: 


Tana Xavier MD [Primary Care Provider] - 1-2 days


Time of Disposition: 14:46

## 2020-01-21 ENCOUNTER — HOSPITAL ENCOUNTER (EMERGENCY)
Dept: HOSPITAL 47 - EC | Age: 25
Discharge: HOME | End: 2020-01-21
Payer: COMMERCIAL

## 2020-01-21 VITALS — TEMPERATURE: 98.1 F | RESPIRATION RATE: 18 BRPM | HEART RATE: 67 BPM

## 2020-01-21 VITALS — DIASTOLIC BLOOD PRESSURE: 83 MMHG | SYSTOLIC BLOOD PRESSURE: 121 MMHG

## 2020-01-21 DIAGNOSIS — G43.909: Primary | ICD-10-CM

## 2020-01-21 DIAGNOSIS — Z88.1: ICD-10-CM

## 2020-01-21 DIAGNOSIS — Z91.018: ICD-10-CM

## 2020-01-21 DIAGNOSIS — Z88.2: ICD-10-CM

## 2020-01-21 PROCEDURE — 96375 TX/PRO/DX INJ NEW DRUG ADDON: CPT

## 2020-01-21 PROCEDURE — 99283 EMERGENCY DEPT VISIT LOW MDM: CPT

## 2020-01-21 PROCEDURE — 96374 THER/PROPH/DIAG INJ IV PUSH: CPT

## 2020-01-21 PROCEDURE — 96361 HYDRATE IV INFUSION ADD-ON: CPT

## 2020-01-21 NOTE — ED
General Adult HPI





- General


Chief complaint: Headache


Stated complaint: Headache


Time Seen by Provider: 20 01:36


Source: patient, RN notes reviewed, old records reviewed


Mode of arrival: ambulatory


Limitations: no limitations





- History of Present Illness


Initial comments: 





Patient is a 24-year-old female who presents emergency department today for 

evaluation for migraine-like headache, reports it's mainly over the back of her 

head and scalp for the past 4 days.  She reports she's been taking ibuprofen, 

naproxen and Tylenol, as well as smoking weed but has had no relief of her 

symptoms.  She reports that she has complained of some nausea.  Pain is worse 

with bright loud noises and bright lights.  She's had a long history of 

migraines since she has been 8 years old.  Patient reports that this feels no 

different from previous.





- Related Data


                                  Previous Rx's











 Medication  Instructions  Recorded


 


Amoxicillin/Potassium Clav 1 tab PO Q12HR #20 tab 10/28/19





[Augmentin 875-125 Tablet]  


 


Ipratropium-Albuterol Nebulize 3 ml INHALATION QID #1 box 10/28/19





[Duoneb 0.5 mg-3 mg/3 ml Soln]  


 


predniSONE 50 mg PO DAILY #5 tab 10/28/19


 


Ibuprofen [Motrin] 600 mg PO Q8HR PRN #20 tab 20


 


Metoclopramide [Reglan] 10 mg PO ACHS #12 tab 20











                                    Allergies











Allergy/AdvReac Type Severity Reaction Status Date / Time


 


ceftriaxone [From Rocephin] Allergy  Rash/Hives Verified 20 01:30


 


soy Allergy  Swelling Verified 20 01:30


 


sulfamethoxazole Allergy  Rash/Hives/ Verified 20 01:30





[From Bactrim]   fever  


 


sumatriptan [From Imitrex] Allergy  Anaphylaxis Verified 20 01:30


 


trimethoprim [From Bactrim] Allergy  Rash/Hives/ Verified 20 01:30





   fever  














Review of Systems


ROS Statement: 


Those systems with pertinent positive or pertinent negative responses have been 

documented in the HPI.





ROS Other: All systems not noted in ROS Statement are negative.





Past Medical History


Past Medical History: Asthma, Pneumonia, Thyroid Disorder


Additional Past Medical History / Comment(s): hypothyroid, migraines,


History of Any Multi-Drug Resistant Organisms: None Reported


Past Surgical History:  Section


Past Anesthesia/Blood Transfusion Reactions: No Reported Reaction


Past Psychological History: Anxiety, Depression


Smoking Status: Never smoker


Past Alcohol Use History: Rare


Past Drug Use History: Marijuana





- Past Family History


  ** Mother


Family Medical History: Cancer





General Exam


Limitations: no limitations


General appearance: alert, in no apparent distress


Head exam: Present: atraumatic, normocephalic, normal inspection


Eye exam: Present: normal appearance, PERRL, EOMI.  Absent: scleral icterus, 

conjunctival injection, periorbital swelling


ENT exam: Present: normal exam, mucous membranes moist


Neck exam: Present: normal inspection.  Absent: tenderness, meningismus, 

lymphadenopathy


Respiratory exam: Present: normal lung sounds bilaterally.  Absent: respiratory 

distress, wheezes, rales, rhonchi, stridor


Cardiovascular Exam: Present: regular rate, normal rhythm, normal heart sounds. 

Absent: systolic murmur, diastolic murmur, rubs, gallop, clicks


GI/Abdominal exam: Present: soft, normal bowel sounds.  Absent: distended, 

tenderness, guarding, rebound, rigid


Extremities exam: Present: normal inspection, full ROM, normal capillary refill.

 Absent: tenderness, pedal edema, joint swelling, calf tenderness


Back exam: Present: normal inspection


Neurological exam: Present: alert, oriented X3, CN II-XII intact


  ** Expanded


Patient oriented to: Present: person, place, time


Speech: Present: fluid speech


Cranial nerves: EOM's Intact: Normal


Cerebellar function: Finger to Nose: Normal


Upper motor neuron: Pronator Drift: Normal


Sensory exam: Upper Extremity Light Touch: Normal, Lower Extremity Light Touch: 

Normal


Motor strength exam: RUE: 5, LUE: 5, RLE: 5, LLE: 5


Eye Response: (4) open spontaneously


Motor Response: (6) obeys commands


Verbal Response: (5) oriented


Yousif Total: 15


Psychiatric exam: Present: normal affect, normal mood


Skin exam: Present: warm, dry, intact, normal color.  Absent: rash





Course


                                   Vital Signs











  20





  01:26


 


Temperature 98.1 F


 


Pulse Rate 67


 


Respiratory 18





Rate 


 


Blood Pressure 134/61


 


O2 Sat by Pulse 100





Oximetry 














Medical Decision Making





- Medical Decision Making


24-year-old female presents with migraine-like headache.  Symptoms are 4 days.  

No neurological deficits.  No fever.  No meningeal signs or chills or is appears

in no distress.  She is given migraine cocktail.  On reevaluation she is resting

in bed, states that her headache is now 1 out of 10.  I discussed that Patient 

can be discharged at this time with following up with neurology.  Discussed 

return parameters and primary care follow-up.








Disposition


Clinical Impression: 


 Migraine





Disposition: HOME SELF-CARE


Condition: Good


Instructions (If sedation given, give patient instructions):  Acute Headache 

(ED)


Additional Instructions: 


Please use medication as discussed. Please follow up with family doctor if 

symptoms have not improved over the next two days. Please return to the 

emergency room if your symptoms increase or worsen or for any other concerns.


Prescriptions: 


Ibuprofen [Motrin] 600 mg PO Q8HR PRN #20 tab


 PRN Reason: Pain


Metoclopramide [Reglan] 10 mg PO ACHS #12 tab


Is patient prescribed a controlled substance at d/c from ED?: No


Referrals: 


Itz Narvaez MD [Primary Care Provider] - 1-2 days


Time of Disposition: 03:10

## 2020-10-13 ENCOUNTER — HOSPITAL ENCOUNTER (OUTPATIENT)
Dept: HOSPITAL 47 - LABWHC1 | Age: 25
Discharge: HOME | End: 2020-10-13
Attending: PHYSICIAN ASSISTANT
Payer: COMMERCIAL

## 2020-10-13 DIAGNOSIS — R05: ICD-10-CM

## 2020-10-13 DIAGNOSIS — R06.00: Primary | ICD-10-CM

## 2021-01-26 ENCOUNTER — HOSPITAL ENCOUNTER (EMERGENCY)
Dept: HOSPITAL 47 - EC | Age: 26
Discharge: HOME | End: 2021-01-26
Payer: COMMERCIAL

## 2021-01-26 VITALS
TEMPERATURE: 99.3 F | DIASTOLIC BLOOD PRESSURE: 80 MMHG | RESPIRATION RATE: 18 BRPM | HEART RATE: 89 BPM | SYSTOLIC BLOOD PRESSURE: 132 MMHG

## 2021-01-26 DIAGNOSIS — S76.112A: Primary | ICD-10-CM

## 2021-01-26 DIAGNOSIS — Z88.2: ICD-10-CM

## 2021-01-26 DIAGNOSIS — Z88.1: ICD-10-CM

## 2021-01-26 DIAGNOSIS — Z88.8: ICD-10-CM
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
## 2021-01-26 DIAGNOSIS — Z91.018: ICD-10-CM

## 2021-01-26 PROCEDURE — 99283 EMERGENCY DEPT VISIT LOW MDM: CPT

## 2021-01-26 NOTE — ED
Lower Extremity Injury HPI





- General


Chief Complaint: Extremity Injury, Lower


Stated Complaint: lt thigh pain


Time Seen by Provider: 21 02:44


Source: patient


Mode of arrival: wheelchair


Limitations: no limitations





- History of Present Illness


Initial Comments: 





This patient is a 25-year-old woman who presents to be evaluated for left thigh 

pain.  She states that it started approximately one hour ago while she was 

working out.  The patient states she was doing "squat to stands," when she felt 

a pop or tear in the anterior part of her left thigh.  States that since that 

time she when she tries to lift her left leg she has pain.  Patient did not fall

or have any other injury.  She denies any weakness or numbness.


MD Complaint: thigh injury


Onset/Timin


-: hour(s)


Injury: Thigh: Left


Place: home


Severity: mild


Improves With: nothing


Worsens With: movement


Context: other


Associated Symptoms: snap/pop sensation





- Related Data


                                  Previous Rx's











 Medication  Instructions  Recorded


 


Amoxicillin/Potassium Clav 1 tab PO Q12HR #20 tab 10/28/19





[Augmentin 875-125 Tablet]  


 


Ipratropium-Albuterol Nebulize 3 ml INHALATION QID #1 box 10/28/19





[Duoneb 0.5 mg-3 mg/3 ml Soln]  


 


predniSONE 50 mg PO DAILY #5 tab 10/28/19


 


Ibuprofen [Motrin] 600 mg PO Q8HR PRN #20 tab 20


 


Metoclopramide [Reglan] 10 mg PO ACHS #12 tab 20


 


Cyclobenzaprine [Flexeril] 10 mg PO TID #20 tab 21


 


Ibuprofen 800 mg PO TID #20 tablet 21











                                    Allergies











Allergy/AdvReac Type Severity Reaction Status Date / Time


 


ceftriaxone [From Rocephin] Allergy  Rash/Hives Verified 21 02:33


 


soy Allergy  Swelling Verified 21 02:33


 


sulfamethoxazole Allergy  Rash/Hives/ Verified 21 02:33





[From Bactrim]   fever  


 


sumatriptan [From Imitrex] Allergy  Anaphylaxis Verified 21 02:33


 


trimethoprim [From Bactrim] Allergy  Rash/Hives/ Verified 21 02:33





   fever  














Review of Systems


ROS Statement: 


Those systems with pertinent positive or pertinent negative responses have been 

documented in the HPI.





ROS Other: All systems not noted in ROS Statement are negative.


Constitutional: Denies: fever, chills, weakness


Respiratory: Denies: cough, dyspnea


Cardiovascular: Denies: chest pain, palpitations, edema


Gastrointestinal: Denies: abdominal pain


Musculoskeletal: Reports: as per HPI, myalgia.  Denies: back pain


Skin: Denies: rash


Neurological: Denies: headache, weakness, numbness, paresthesias





Past Medical History


Past Medical History: Asthma, Pneumonia, Thyroid Disorder


Additional Past Medical History / Comment(s): hypothyroid, migraines,


History of Any Multi-Drug Resistant Organisms: None Reported


Past Surgical History:  Section


Past Anesthesia/Blood Transfusion Reactions: No Reported Reaction


Past Psychological History: Anxiety, Depression


Smoking Status: Never smoker


Past Alcohol Use History: Rare


Past Drug Use History: Marijuana





- Past Family History


  ** Mother


Family Medical History: Cancer





General Exam


Limitations: no limitations


General appearance: alert, in no apparent distress


Respiratory exam: Present: normal lung sounds bilaterally.  Absent: respiratory 

distress, wheezes, rales, rhonchi, stridor


Cardiovascular Exam: Present: regular rate, normal rhythm, normal heart sounds. 

Absent: systolic murmur, diastolic murmur, rubs, gallop


GI/Abdominal exam: Present: soft.  Absent: tenderness, guarding, rebound


  ** Left


Hip exam: Absent: tenderness, swelling, abrasion, laceration, ecchymosis


Upper Leg exam: Present: normal inspection, tenderness.  Absent: swelling, 

abrasion, laceration, ecchymosis, deformity, crepitus, dislocation


Knee exam: Present: normal inspection, full ROM.  Absent: tenderness, swelling, 

abrasion, laceration


Lower Leg exam: Present: normal inspection, full ROM.  Absent: tenderness, 

swelling, abrasion


Ankle exam: Present: normal inspection, full ROM.  Absent: tenderness, swelling,

abrasion, laceration


Foot/Toe exam: Present: normal inspection, full ROM.  Absent: tenderness, 

swelling, abrasion, laceration


Neurovascular tendon exam: Present: no vascular compromise.  Absent: abnormal 

cap refill, motor deficit, sensory deficit, tendon deficit, extremity cold to 

touch, pallor


Neurological exam: Present: alert.  Absent: motor sensory deficit (No 

sensorimotor deficit to left leg)


Skin exam: Present: warm, dry, intact, normal color.  Absent: rash





Course


                                   Vital Signs











  21





  02:29


 


Temperature 99.3 F


 


Pulse Rate 89


 


Respiratory 18





Rate 


 


Blood Pressure 132/80


 


O2 Sat by Pulse 100





Oximetry 














Disposition


Clinical Impression: 


 Quadriceps muscle strain





Disposition: HOME SELF-CARE


Condition: Good


Instructions (If sedation given, give patient instructions):  Muscle Strain (DC)


Prescriptions: 


Cyclobenzaprine [Flexeril] 10 mg PO TID #20 tab


Ibuprofen 800 mg PO TID #20 tablet


Is patient prescribed a controlled substance at d/c from ED?: No


Referrals: 


David Marvin DO [Primary Care Provider] - 1-2 days

## 2021-01-26 NOTE — XR
EXAM:

  XR Left Femur, 2 Views

 

CLINICAL HISTORY:

  Reason: injury

Upper thigh pain. [place] while working out. 

 

TECHNIQUE:

  Frontal and lateral views of the left femur.  4 images.

 

COMPARISON:

  No relevant prior studies available.

 

FINDINGS:

  Bones/joints:  Unremarkable.  No acute fracture.  No dislocation.

  Soft tissues:  Unremarkable.

 

IMPRESSION:     

  Normal left femur x-rays.

## 2021-02-02 ENCOUNTER — HOSPITAL ENCOUNTER (OUTPATIENT)
Dept: HOSPITAL 47 - RADUSWWP | Age: 26
Discharge: HOME | End: 2021-02-02
Attending: FAMILY MEDICINE
Payer: COMMERCIAL

## 2021-02-02 DIAGNOSIS — N93.9: Primary | ICD-10-CM

## 2021-02-02 PROCEDURE — 76856 US EXAM PELVIC COMPLETE: CPT

## 2021-02-02 NOTE — US
EXAMINATION TYPE: US pelvic complete

 

DATE OF EXAM: 2/2/2021

 

COMPARISON: NONE

 

CLINICAL HISTORY: N93.9 Abnormal bleeding R10.30 lower abd pain. Pt states abnormal vaginal bleeding 
x 6 months

 

TECHNIQUE:  Transabdominal (TA).  Transabdominal sonographic images of the pelvis were acquired.  

 

Date of LMP:  Unknown

 

EXAM MEASUREMENTS:

 

Uterus:  9.3 x 3.5 x 4.3 cm

Endometrial Stripe: 0.8 cm

Right Ovary:  2.9 x 2.7 x 1.7 cm

Left Ovary:  2.6 x 2.5 x 1.7 cm

 

 

 

1. Uterus:  Anteverted   wnl

2. Endometrium:  wnl

3. Right Ovary:  wnl

4. Left Ovary:  wnl

5. Bilateral Adnexa:  wnl

6. Posterior cul-de-sac:  wnl

 

 

 

IMPRESSION:

No distinct abnormality is appreciated.

## 2021-08-28 ENCOUNTER — HOSPITAL ENCOUNTER (EMERGENCY)
Dept: HOSPITAL 47 - EC | Age: 26
Discharge: HOME | End: 2021-08-28
Payer: COMMERCIAL

## 2021-08-28 VITALS
RESPIRATION RATE: 18 BRPM | SYSTOLIC BLOOD PRESSURE: 130 MMHG | TEMPERATURE: 98.2 F | DIASTOLIC BLOOD PRESSURE: 71 MMHG | HEART RATE: 64 BPM

## 2021-08-28 DIAGNOSIS — F12.90: ICD-10-CM

## 2021-08-28 DIAGNOSIS — E03.9: ICD-10-CM

## 2021-08-28 DIAGNOSIS — R21: Primary | ICD-10-CM

## 2021-08-28 DIAGNOSIS — F32.9: ICD-10-CM

## 2021-08-28 DIAGNOSIS — Z88.2: ICD-10-CM

## 2021-08-28 DIAGNOSIS — J45.909: ICD-10-CM

## 2021-08-28 DIAGNOSIS — F41.9: ICD-10-CM

## 2021-08-28 DIAGNOSIS — Z79.51: ICD-10-CM

## 2021-08-28 DIAGNOSIS — G43.909: ICD-10-CM

## 2021-08-28 DIAGNOSIS — Z88.1: ICD-10-CM

## 2021-08-28 PROCEDURE — 99282 EMERGENCY DEPT VISIT SF MDM: CPT

## 2021-08-28 NOTE — ED
General Adult HPI





- General


Chief complaint: Skin/Abscess/Foreign Body


Stated complaint: rash around mouth and nose


Time Seen by Provider: 21 13:42


Source: patient


Mode of arrival: ambulatory


Limitations: no limitations





- History of Present Illness


Initial comments: 





25-year-old female presents to the emergency room for a chief complaint of rash.

 Patient states she has had a rash around her nose and lips for 7 months.  

Patient states she has tried several creams including an antifungal cream, 

antibacterial cream, and steroids.  Patient states each cream seems to help but 

then it comes back.  Patient has seen her doctor 3 times for this but has not 

yet seen a dermatologist.  Patient denies fevers or chills.  States sometimes it

has a burning sensation.Patient has no other complaints at this time including 

shortness of breath, chest pain, abdominal pain, nausea or vomiting, headache, 

or visual changes.





- Related Data


                                  Previous Rx's











 Medication  Instructions  Recorded


 


Amoxicillin/Potassium Clav 1 tab PO Q12HR #20 tab 10/28/19





[Augmentin 875-125 Tablet]  


 


Ipratropium-Albuterol Nebulize 3 ml INHALATION QID #1 box 10/28/19





[Duoneb 0.5 mg-3 mg/3 ml Soln]  


 


predniSONE 50 mg PO DAILY #5 tab 10/28/19


 


Ibuprofen [Motrin] 600 mg PO Q8HR PRN #20 tab 20


 


Metoclopramide [Reglan] 10 mg PO ACHS #12 tab 20


 


Cyclobenzaprine [Flexeril] 10 mg PO TID #20 tab 21


 


Ibuprofen 800 mg PO TID #20 tablet 21


 


Hydrocortisone Oint 1 applic TOPICAL BID 5 Days #5 gm 21





[Hydrocortisone 1% Oint]  











                                    Allergies











Allergy/AdvReac Type Severity Reaction Status Date / Time


 


ceftriaxone [From Rocephin] Allergy  Rash/Hives Verified 21 13:27


 


soy Allergy  Swelling Verified 21 13:27


 


sulfamethoxazole Allergy  Rash/Hives/ Verified 21 13:27





[From Bactrim]   fever  


 


sumatriptan [From Imitrex] Allergy  Anaphylaxis Verified 21 13:27


 


trimethoprim [From Bactrim] Allergy  Rash/Hives/ Verified 21 13:27





   fever  














Review of Systems


ROS Statement: 


Those systems with pertinent positive or pertinent negative responses have been 

documented in the HPI.





ROS Other: All systems not noted in ROS Statement are negative.





Past Medical History


Past Medical History: Asthma, Pneumonia, Thyroid Disorder


Additional Past Medical History / Comment(s): hypothyroid, migraines,


History of Any Multi-Drug Resistant Organisms: None Reported


Past Surgical History:  Section


Past Anesthesia/Blood Transfusion Reactions: No Reported Reaction


Past Psychological History: Anxiety, Depression


Smoking Status: Never smoker


Past Alcohol Use History: Rare


Past Drug Use History: Marijuana





- Past Family History


  ** Mother


Family Medical History: Cancer





General Exam


Limitations: no limitations


General appearance: alert, in no apparent distress


Head exam: Present: atraumatic


Eye exam: Present: normal appearance, PERRL, EOMI.  Absent: scleral icterus, 

conjunctival injection


ENT exam: Present: mucous membranes moist, other (Scaling erythematous plaques 

around the mouth as well as by the sides of the nose.  No evidence for 

cellulitis or abscess)


Neck exam: Present: normal inspection, full ROM.  Absent: tenderness


Respiratory exam: Absent: respiratory distress


Neurological exam: Present: alert





Course


                                   Vital Signs











  21





  13:27


 


Temperature 98.2 F


 


Pulse Rate 64


 


Respiratory 18





Rate 


 


Blood Pressure 130/71


 


O2 Sat by Pulse 98





Oximetry 














Medical Decision Making





- Medical Decision Making





Symptoms have been ongoing for 7 months.  Patient has tried several creams.  She

is currently on another antibiotic cream that she started yesterday.  At this 

point I recommend continuing the antibiotic cream and we will again try another 

steroid cream.  However stressed the importance of following with dermatology as

she has tried several different medications.  She is agreeable to this.  I did 

give several referrals.  She will return here for any worsening symptoms.





Disposition


Clinical Impression: 


 Rash





Disposition: HOME SELF-CARE


Condition: Good


Instructions (If sedation given, give patient instructions):  Dermatitis (ED)


Additional Instructions: 


Continue antibiotic cream.  Try steroid cream and lotion. Follow up with your 

doctor in 1-2 days as well as dermatology. Return to the ER for any worsening 

symptoms such as fevers


Prescriptions: 


Hydrocortisone Oint [Hydrocortisone 1% Oint] 1 applic TOPICAL BID 5 Days #5 gm


Is patient prescribed a controlled substance at d/c from ED?: No


Referrals: 


David Marvin DO [Primary Care Provider] - 1-2 days


Margaret Childs MD [STAFF PHYSICIAN] - 1-2 days


Gary Costa MD [REFERRING] - 1-2 days


Time of Disposition: 14:14

## 2021-09-24 ENCOUNTER — HOSPITAL ENCOUNTER (EMERGENCY)
Dept: HOSPITAL 47 - EC | Age: 26
Discharge: HOME | End: 2021-09-24
Payer: COMMERCIAL

## 2021-09-24 VITALS — HEART RATE: 60 BPM | RESPIRATION RATE: 16 BRPM

## 2021-09-24 VITALS — TEMPERATURE: 98 F

## 2021-09-24 DIAGNOSIS — E03.9: ICD-10-CM

## 2021-09-24 DIAGNOSIS — J03.90: Primary | ICD-10-CM

## 2021-09-24 DIAGNOSIS — F41.9: ICD-10-CM

## 2021-09-24 DIAGNOSIS — J45.909: ICD-10-CM

## 2021-09-24 DIAGNOSIS — F32.9: ICD-10-CM

## 2021-09-24 PROCEDURE — 87430 STREP A AG IA: CPT

## 2021-09-24 PROCEDURE — 96372 THER/PROPH/DIAG INJ SC/IM: CPT

## 2021-09-24 PROCEDURE — 87081 CULTURE SCREEN ONLY: CPT

## 2021-09-24 PROCEDURE — 87635 SARS-COV-2 COVID-19 AMP PRB: CPT

## 2021-09-24 PROCEDURE — 99283 EMERGENCY DEPT VISIT LOW MDM: CPT

## 2021-09-24 NOTE — ED
ENT HPI





- General


Chief complaint: ENT


Stated complaint: sore throat


Time Seen by Provider: 21 10:57


Source: patient, RN notes reviewed


Mode of arrival: ambulatory


Limitations: no limitations





- History of Present Illness


Initial comments: 





Patient is a 25 she'll female that presents to emergency department complaining 

of throat pain and sore throat for the past several days.  She notes that she is

not tested for Covid or has a history of strep throat.  She was otherwise a 

well-appearing female in no apparent distress or pain.  She noted that she did 

have some difficulty swallowing due to discomfort.  She denied chest pain 

shortness of breath headache nausea vomiting diarrhea constipation fever fatigue

chills.





- Related Data


                                  Previous Rx's











 Medication  Instructions  Recorded


 


Amoxicillin/Potassium Clav 1 tab PO Q12HR #20 tab 10/28/19





[Augmentin 875-125 Tablet]  


 


Ipratropium-Albuterol Nebulize 3 ml INHALATION QID #1 box 10/28/19





[Duoneb 0.5 mg-3 mg/3 ml Soln]  


 


predniSONE 50 mg PO DAILY #5 tab 10/28/19


 


Ibuprofen [Motrin] 600 mg PO Q8HR PRN #20 tab 20


 


Metoclopramide [Reglan] 10 mg PO ACHS #12 tab 20


 


Cyclobenzaprine [Flexeril] 10 mg PO TID #20 tab 21


 


Ibuprofen 800 mg PO TID #20 tablet 21


 


Hydrocortisone Oint 1 applic TOPICAL BID 5 Days #5 gm 21





[Hydrocortisone 1% Oint]  


 


Azithromycin [Zithromax Z-pack (6 0 mg PO AS DIRECTED #6 tab 21





tabs)]  











                                    Allergies











Allergy/AdvReac Type Severity Reaction Status Date / Time


 


ceftriaxone [From Rocephin] Allergy  Rash/Hives Verified 21 10:53


 


soy Allergy  Swelling Verified 21 10:53


 


sulfamethoxazole Allergy  Rash/Hives/ Verified 21 10:53





[From Bactrim]   fever  


 


sumatriptan [From Imitrex] Allergy  Anaphylaxis Verified 21 10:53


 


trimethoprim [From Bactrim] Allergy  Rash/Hives/ Verified 21 10:53





   fever  














Review of Systems


ROS Statement: 


Those systems with pertinent positive or pertinent negative responses have been 

documented in the HPI.





ROS Other: All systems not noted in ROS Statement are negative.





Past Medical History


Past Medical History: Asthma, Pneumonia, Thyroid Disorder


Additional Past Medical History / Comment(s): hypothyroid, migraines,


History of Any Multi-Drug Resistant Organisms: None Reported


Past Surgical History:  Section


Past Anesthesia/Blood Transfusion Reactions: No Reported Reaction


Past Psychological History: Anxiety, Depression


Smoking Status: Never smoker


Past Alcohol Use History: Rare


Past Drug Use History: None Reported, Marijuana





- Past Family History


  ** Mother


Family Medical History: Cancer





General Exam


Limitations: no limitations


General appearance: alert, in no apparent distress


Head exam: Present: atraumatic, normocephalic, normal inspection


Eye exam: Present: normal appearance, PERRL, EOMI.  Absent: scleral icterus, 

conjunctival injection, periorbital swelling


ENT exam: Present: normal exam, mucous membranes moist.  Absent: normal 

oropharynx (Erythematous, swollen tonsils.)


Neck exam: Present: normal inspection, full ROM.  Absent: tenderness, 

lymphadenopathy


Respiratory exam: Present: normal lung sounds bilaterally.  Absent: respiratory 

distress, wheezes, rales, rhonchi, stridor


Cardiovascular Exam: Present: regular rate, normal rhythm, normal heart sounds. 

Absent: systolic murmur, diastolic murmur, rubs, gallop, clicks


Extremities exam: Present: normal inspection, full ROM, normal capillary refill.

 Absent: tenderness, pedal edema, joint swelling, calf tenderness


Neurological exam: Present: alert, oriented X3


Psychiatric exam: Present: normal affect, normal mood


Skin exam: Present: warm, dry, intact, normal color.  Absent: rash





Course





                                   Vital Signs











  21





  10:53


 


Temperature 98 F














Medical Decision Making





- Medical Decision Making





25 she'll female with a sore throat for the past several days.


Covid test, strep test ordered.


800 mg of Motrin, 10 mg of Decadron ordered.


Covid and strep test both negative.  Patient most likely has tonsillitis, 

antibiotics sent to pharmacy.


Case discussed with Dr. Berrios, patient discharge home with follow-up primary 

care.





- Lab Data





                                   Lab Results











  21 Range/Units





  11:06 11:06 


 


Coronavirus (PCR)   Not Detected  (Not Detectd)  


 


Group A Strep Rapid  Negative   (Negative)  














Disposition


Clinical Impression: 


 Tonsillitis, Sore throat





Disposition: HOME SELF-CARE


Condition: Stable


Instructions (If sedation given, give patient instructions):  Tonsillitis (ED)


Additional Instructions: 


Please return to the Emergency Department if symptoms worsen or any other 

concerns.


Follow-up primary care 1-2 days.


Tylenol and Motrin as day for pain.


Take antibiotics as prescribed.





Is patient prescribed a controlled substance at d/c from ED?: No


Referrals: 


David Marvin DO [Primary Care Provider] - 1-2 days


Time of Disposition: 12:31

## 2022-08-12 ENCOUNTER — HOSPITAL ENCOUNTER (OUTPATIENT)
Dept: HOSPITAL 47 - RADUSWWP | Age: 27
Discharge: HOME | End: 2022-08-12
Attending: FAMILY MEDICINE
Payer: COMMERCIAL

## 2022-08-12 DIAGNOSIS — N92.0: Primary | ICD-10-CM

## 2022-08-12 PROCEDURE — 76830 TRANSVAGINAL US NON-OB: CPT

## 2022-08-12 NOTE — US
EXAMINATION TYPE: US transvaginal

 

DATE OF EXAM: 8/12/2022

 

COMPARISON: 2/2/2021

 

CLINICAL HISTORY: N92.0 EXCESSIVE AND FREQUENT MENSTRUATION WITH REG. Put on birth control bleeding x
 2 weeks. 

 

TECHNIQUE:  Transvaginal (TV).  

 

 

EXAM MEASUREMENTS:

 

Uterus:  7.9 x 3.8 x 4.7  cm

Endometrial Stripe: .3 cm

Right Ovary:  2.6 x 1.5 x 2.4  cm

Left Ovary:  3.0 x 1.6 x 2.4  cm

 

 

 

1. Uterus:  Anteverted   Heterogenous area measuring 2.9 x 2.5 x 1.6 cm.

2. Endometrium:  wnl

3. Right Ovary:  wnl

4. Left Ovary:  wnl

5. Bilateral Adnexa:  wnl

6. Posterior cul-de-sac:  wnl

 

 

 

IMPRESSION:

1.  No evidence for acute pelvic process.

2.  Fibroid changes.

## 2023-01-13 ENCOUNTER — HOSPITAL ENCOUNTER (EMERGENCY)
Dept: HOSPITAL 47 - EC | Age: 28
Discharge: HOME | End: 2023-01-13
Payer: COMMERCIAL

## 2023-01-13 VITALS — HEART RATE: 65 BPM | SYSTOLIC BLOOD PRESSURE: 128 MMHG | DIASTOLIC BLOOD PRESSURE: 90 MMHG

## 2023-01-13 VITALS — RESPIRATION RATE: 18 BRPM | TEMPERATURE: 98.7 F

## 2023-01-13 DIAGNOSIS — J45.909: ICD-10-CM

## 2023-01-13 DIAGNOSIS — K21.9: ICD-10-CM

## 2023-01-13 DIAGNOSIS — R07.9: Primary | ICD-10-CM

## 2023-01-13 DIAGNOSIS — Z79.899: ICD-10-CM

## 2023-01-13 DIAGNOSIS — Z88.8: ICD-10-CM

## 2023-01-13 DIAGNOSIS — F12.90: ICD-10-CM

## 2023-01-13 DIAGNOSIS — Z88.2: ICD-10-CM

## 2023-01-13 DIAGNOSIS — F32.A: ICD-10-CM

## 2023-01-13 DIAGNOSIS — F41.9: ICD-10-CM

## 2023-01-13 DIAGNOSIS — E03.9: ICD-10-CM

## 2023-01-13 DIAGNOSIS — Z88.1: ICD-10-CM

## 2023-01-13 LAB
ALBUMIN SERPL-MCNC: 4 G/DL (ref 3.5–5)
ALP SERPL-CCNC: 56 U/L (ref 38–126)
ALT SERPL-CCNC: 33 U/L (ref 4–34)
ANION GAP SERPL CALC-SCNC: 7 MMOL/L
APTT BLD: 21.7 SEC (ref 22–30)
AST SERPL-CCNC: 26 U/L (ref 14–36)
BASOPHILS # BLD AUTO: 0 K/UL (ref 0–0.2)
BASOPHILS NFR BLD AUTO: 1 %
BUN SERPL-SCNC: 12 MG/DL (ref 7–17)
CALCIUM SPEC-MCNC: 8.7 MG/DL (ref 8.4–10.2)
CHLORIDE SERPL-SCNC: 109 MMOL/L (ref 98–107)
CO2 SERPL-SCNC: 26 MMOL/L (ref 22–30)
EOSINOPHIL # BLD AUTO: 0.1 K/UL (ref 0–0.7)
EOSINOPHIL NFR BLD AUTO: 2 %
ERYTHROCYTE [DISTWIDTH] IN BLOOD BY AUTOMATED COUNT: 4.48 M/UL (ref 3.8–5.4)
ERYTHROCYTE [DISTWIDTH] IN BLOOD: 12.8 % (ref 11.5–15.5)
GLUCOSE SERPL-MCNC: 84 MG/DL (ref 74–99)
HCG SERPL-MCNC: <2.4 MIU/ML
HCT VFR BLD AUTO: 38.9 % (ref 34–46)
HGB BLD-MCNC: 13.4 GM/DL (ref 11.4–16)
INR PPP: 0.9 (ref ?–1.2)
LYMPHOCYTES # SPEC AUTO: 1.9 K/UL (ref 1–4.8)
LYMPHOCYTES NFR SPEC AUTO: 35 %
MAGNESIUM SPEC-SCNC: 1.9 MG/DL (ref 1.6–2.3)
MCH RBC QN AUTO: 30 PG (ref 25–35)
MCHC RBC AUTO-ENTMCNC: 34.5 G/DL (ref 31–37)
MCV RBC AUTO: 87 FL (ref 80–100)
MONOCYTES # BLD AUTO: 0.3 K/UL (ref 0–1)
MONOCYTES NFR BLD AUTO: 6 %
NEUTROPHILS # BLD AUTO: 3 K/UL (ref 1.3–7.7)
NEUTROPHILS NFR BLD AUTO: 54 %
PLATELET # BLD AUTO: 240 K/UL (ref 150–450)
POTASSIUM SERPL-SCNC: 4.1 MMOL/L (ref 3.5–5.1)
PROT SERPL-MCNC: 6.7 G/DL (ref 6.3–8.2)
PT BLD: 10.1 SEC (ref 9–12)
SODIUM SERPL-SCNC: 142 MMOL/L (ref 137–145)
WBC # BLD AUTO: 5.5 K/UL (ref 3.8–10.6)

## 2023-01-13 PROCEDURE — 96374 THER/PROPH/DIAG INJ IV PUSH: CPT

## 2023-01-13 PROCEDURE — 71046 X-RAY EXAM CHEST 2 VIEWS: CPT

## 2023-01-13 PROCEDURE — 84484 ASSAY OF TROPONIN QUANT: CPT

## 2023-01-13 PROCEDURE — 85025 COMPLETE CBC W/AUTO DIFF WBC: CPT

## 2023-01-13 PROCEDURE — 84702 CHORIONIC GONADOTROPIN TEST: CPT

## 2023-01-13 PROCEDURE — 85730 THROMBOPLASTIN TIME PARTIAL: CPT

## 2023-01-13 PROCEDURE — 99285 EMERGENCY DEPT VISIT HI MDM: CPT

## 2023-01-13 PROCEDURE — 80053 COMPREHEN METABOLIC PANEL: CPT

## 2023-01-13 PROCEDURE — 36415 COLL VENOUS BLD VENIPUNCTURE: CPT

## 2023-01-13 PROCEDURE — 85610 PROTHROMBIN TIME: CPT

## 2023-01-13 PROCEDURE — 83735 ASSAY OF MAGNESIUM: CPT

## 2023-01-13 NOTE — ED
General Adult HPI





- General


Chief complaint: Chest Pain


Stated complaint: Chest pain


Time Seen by Provider: 23 17:14


Source: patient


Mode of arrival: ambulatory


Limitations: no limitations





- History of Present Illness


Initial comments: 


Dictation was produced using dragon dictation software. please excuse any 

grammatical, word or spelling errors. 











Chief Complaint: 27-year-old female presents emergency part for chest pain





History of Present Illness: Patient is a 27-year-old female with no significant 

past medical history presents emergency department for chest pain.  She states 

it's sharp and dull at the same time.  Nonradiating.  No associated diaphoresis 

or nausea.  No history of blood clots.  Denies any Pain.  No numbness and 

paresthesias to the arms or legs.








The ROS documented in this emergency department record has been reviewed and 

confirmed by me.  Those systems with pertinent positive or negative responses ha

ve been documented in the HPI.  All other systems are other negative and/or 

noncontributory.








PHYSICAL EXAM:


General Impression: Alert and oriented x3, not in acute distress


HEENT: Normocephalic atraumatic, extra-ocular movements intact, pupils equal and

reactive to light bilaterally, mucous membranes moist.


Cardiovascular: Heart regular rate and rhythm


Chest: Able to complete full sentences, no retractions, no tachypnea


Abdomen: abdomen soft, non-tender, non-distended, no organomegaly


Musculoskeletal: Pulses present and equal in all extremities, no peripheral 

edema


Motor:  no focal deficits noted


Neurological: CN II-XII grossly intact, no focal motor or sensory deficits noted


Skin: Intact with no visualized rashes


Psych: Normal affect and mood





ED course: 27 y Old female presents emergency department for atypical chest with

typical features.  Vital signs upon arrival are within acceptable limits.  

Patient has no high-risk features.  





Nursing notes and chart review was performed





EKG interpreted by me: Ventricular rate 74, sinus rhythm,.  131, QRS 94, QTC 43.

No MS prolongation, no QTC prolongation, no ST or T-wave changes noted.  .  

Overall, this EKG is unremarkable





Laboratory evaluation obtained.  CBC unremarkable.  Coag panel metabolic panel 

is negative.  Troponins is negative.  Rest of labs unremarkable.  Chest x-ray 

shows no acute processes.  Patient observed in emergency department for 2 hours.

 Patient's medically stable.  Patient given Toradol and discharged advised 

follow-up with primary care doctor.  Patient has no high-risk features.





Was pt. sent in by a medical professional or institution (, PA, NP, urgent 

care, hospital, or nursing home...) When possible be specific


@  -No


Did you speak to anyone other than the patient for history (EMS, parent, family,

police, friend...)? What history was obtained from this source 


@  -No


Did you review nursing and triage notes (agree or disagree)?  Why? 


@  -I reviewed and agree with nursing and triage notes


Were old charts reviewed (outside hosp., previous admission, EMS record, old 

EKG, old radiological studies, urgent care reports/EKG's, nursing home records)?

Report findings 


@  -No old charts were reviewed


Differential Diagnosis (chest pain, altered mental status, abdominal pain women,

abdominal pain men, vaginal bleeding, weakness, fever, dyspnea, syncope, 

headache, dizziness, GI bleed, back pain, seizure, CVA, palpatations, mental 

health)? 


@  -Differential Chest Pain:


Stable Angina, Unstable Angina, STEMI, NSTEMI Aortic Dissection, Pneumothorax, 

Musculoskeletal, Esophageal Spasm GERD, Cholecystitis, Pancreatitis, Zoster, 

this is not meant to be an all-inclusive list. 





EKG interpreted by me (3pts min.).


@  -As above


X-rays interpreted by me (1pt min.).


@  -Above


CT interpreted by me (1pt min.).


@  -None done


U/S interpreted by me (1pt. min.).


@  -None done


What testing was considered but not performed or refused? (CT, X-rays, U/S, 

labs)? Why?


@  -D-dimer was considered outpatient not tachycardic or hypoxic or showing any 

signs of respiratory distress


What meds were considered but not given or refused? Why?


@  -None


Did you discuss the management of the patient with other professionals 

(professionals i.e. , PA, NP, lab, RT, psych nurse, , , 

teacher, , )? Give summary


@  -No


Was smoking cessation discussed for >3mins.?


@  -No


Was critical care preformed (if so, how long)?


@  -No


Were there social determinants of health that impacted care today? How? 

(Homelessness, low income, unemployed, alcoholism, drug addiction, 

transportation, low edu. Level, literacy, decrease access to med. care, custodial, 

rehab)?


@  -No


Was there de-escalation of care discussed even if they declined (Discuss DNR or 

withdrawal of care, Hospice)? DNR status


@  -No


What co-morbidities impacted this encounter? (DM, HTN, Smoking, COPD, CAD, 

Cancer, CVA, ARF, Chemo, Hep., AIDS, mental health diagnosis, sleep apnea, m

orbid obesity)?


@  -None


Was patient admitted / discharged? Hospital course, mention meds given and 

route, prescriptions, significant lab abnormalities, going to OR and other 

pertinent info.


@  -See above 


Undiagnosed new problem with uncertain prognosis?


@  -No


Drug Therapy requiring intensive monitoring for toxicity (Heparin, Nitro, 

Insulin, Cardizem)?


@  -No


Were any procedures done?


@  -No


Diagnosis/symptom?


@  -Atypical chest pain with typical features, no high-risk features


Acute, or Chronic, or Acute on Chronic?


@  -Acute


Uncomplicated (without systemic symptoms) or Complicated (systemic symptoms)?


@  -Uncomplicated


Side effects of treatment?


@  -No


Exacerbation, Progression, or Severe Exacerbation?


@  -No


Poses a threat to life or bodily function? How? (Chest pain, USA, MI, pneumonia,

PE, COPD, DKA, ARF, appy, cholecystitis, CVA, Diverticulitis, Homicidal, 

Suicidal, threat to staff... and all critical care pts)


@  -No








- Related Data


                                  Previous Rx's











 Medication  Instructions  Recorded


 


Amoxicillin/Potassium Clav 1 tab PO Q12HR #20 tab 10/28/19





[Augmentin 875-125 Tablet]  


 


Ipratropium-Albuterol Nebulize 3 ml INHALATION QID #1 box 10/28/19





[Duoneb 0.5 mg-3 mg/3 ml Soln]  


 


predniSONE 50 mg PO DAILY #5 tab 10/28/19


 


Ibuprofen [Motrin] 600 mg PO Q8HR PRN #20 tab 20


 


Metoclopramide [Reglan] 10 mg PO ACHS #12 tab 20


 


Cyclobenzaprine [Flexeril] 10 mg PO TID #20 tab 21


 


Ibuprofen 800 mg PO TID #20 tablet 21


 


Hydrocortisone Oint 1 applic TOPICAL BID 5 Days #5 gm 21





[Hydrocortisone 1% Oint]  


 


Azithromycin [Zithromax Z-pack (6 0 mg PO AS DIRECTED #6 tab 21





tabs)]  











                                    Allergies











Allergy/AdvReac Type Severity Reaction Status Date / Time


 


ceftriaxone [From Rocephin] Allergy  Rash/Hives Verified 23 17:13


 


soy Allergy  Swelling Verified 23 17:13


 


sulfamethoxazole Allergy  Rash/Hives/ Verified 23 17:13





[From Bactrim]   fever  


 


sumatriptan [From Imitrex] Allergy  Anaphylaxis Verified 23 17:13


 


trimethoprim [From Bactrim] Allergy  Rash/Hives/ Verified 23 17:13





   fever  














Review of Systems


ROS Statement: 


Those systems with pertinent positive or pertinent negative responses have been 

documented in the HPI.





ROS Other: All systems not noted in ROS Statement are negative.





Past Medical History


Past Medical History: Asthma, Pneumonia, Thyroid Disorder


Additional Past Medical History / Comment(s): hypothyroid, migraines,


History of Any Multi-Drug Resistant Organisms: None Reported


Past Surgical History:  Section


Past Anesthesia/Blood Transfusion Reactions: No Reported Reaction


Past Psychological History: Anxiety, Depression


Smoking Status: Never smoker


Past Alcohol Use History: Rare


Past Drug Use History: None Reported, Marijuana





- Past Family History


  ** Mother


Family Medical History: Cancer





General Exam


Limitations: no limitations





Course


                                   Vital Signs











  23





  17:10


 


Temperature 98.7 F


 


Pulse Rate 76


 


Respiratory 18





Rate 


 


Blood Pressure 118/80


 


O2 Sat by Pulse 99





Oximetry 














Medical Decision Making





- Lab Data


Result diagrams: 


                                 23 17:30





                                 23 17:30


                                   Lab Results











  23 Range/Units





  17:30 17:30 17:30 


 


WBC  5.5    (3.8-10.6)  k/uL


 


RBC  4.48    (3.80-5.40)  m/uL


 


Hgb  13.4    (11.4-16.0)  gm/dL


 


Hct  38.9    (34.0-46.0)  %


 


MCV  87.0    (80.0-100.0)  fL


 


MCH  30.0    (25.0-35.0)  pg


 


MCHC  34.5    (31.0-37.0)  g/dL


 


RDW  12.8    (11.5-15.5)  %


 


Plt Count  240    (150-450)  k/uL


 


MPV  7.8    


 


Neutrophils %  54    %


 


Lymphocytes %  35    %


 


Monocytes %  6    %


 


Eosinophils %  2    %


 


Basophils %  1    %


 


Neutrophils #  3.0    (1.3-7.7)  k/uL


 


Lymphocytes #  1.9    (1.0-4.8)  k/uL


 


Monocytes #  0.3    (0-1.0)  k/uL


 


Eosinophils #  0.1    (0-0.7)  k/uL


 


Basophils #  0.0    (0-0.2)  k/uL


 


PT   10.1   (9.0-12.0)  sec


 


INR   0.9   (<1.2)  


 


APTT   21.7 L   (22.0-30.0)  sec


 


Sodium    142  (137-145)  mmol/L


 


Potassium    4.1  (3.5-5.1)  mmol/L


 


Chloride    109 H  ()  mmol/L


 


Carbon Dioxide    26  (22-30)  mmol/L


 


Anion Gap    7  mmol/L


 


BUN    12  (7-17)  mg/dL


 


Creatinine    1.08 H  (0.52-1.04)  mg/dL


 


Est GFR (CKD-EPI)AfAm    81  (>60 ml/min/1.73 sqM)  


 


Est GFR (CKD-EPI)NonAf    70  (>60 ml/min/1.73 sqM)  


 


Glucose    84  (74-99)  mg/dL


 


Calcium    8.7  (8.4-10.2)  mg/dL


 


Magnesium    1.9  (1.6-2.3)  mg/dL


 


Total Bilirubin    0.4  (0.2-1.3)  mg/dL


 


AST    26  (14-36)  U/L


 


ALT    33  (4-34)  U/L


 


Alkaline Phosphatase    56  ()  U/L


 


Troponin I     (0.000-0.034)  ng/mL


 


Total Protein    6.7  (6.3-8.2)  g/dL


 


Albumin    4.0  (3.5-5.0)  g/dL


 


HCG, Quant    <2.4  mIU/mL














  23 Range/Units





  18:05 


 


WBC   (3.8-10.6)  k/uL


 


RBC   (3.80-5.40)  m/uL


 


Hgb   (11.4-16.0)  gm/dL


 


Hct   (34.0-46.0)  %


 


MCV   (80.0-100.0)  fL


 


MCH   (25.0-35.0)  pg


 


MCHC   (31.0-37.0)  g/dL


 


RDW   (11.5-15.5)  %


 


Plt Count   (150-450)  k/uL


 


MPV   


 


Neutrophils %   %


 


Lymphocytes %   %


 


Monocytes %   %


 


Eosinophils %   %


 


Basophils %   %


 


Neutrophils #   (1.3-7.7)  k/uL


 


Lymphocytes #   (1.0-4.8)  k/uL


 


Monocytes #   (0-1.0)  k/uL


 


Eosinophils #   (0-0.7)  k/uL


 


Basophils #   (0-0.2)  k/uL


 


PT   (9.0-12.0)  sec


 


INR   (<1.2)  


 


APTT   (22.0-30.0)  sec


 


Sodium   (137-145)  mmol/L


 


Potassium   (3.5-5.1)  mmol/L


 


Chloride   ()  mmol/L


 


Carbon Dioxide   (22-30)  mmol/L


 


Anion Gap   mmol/L


 


BUN   (7-17)  mg/dL


 


Creatinine   (0.52-1.04)  mg/dL


 


Est GFR (CKD-EPI)AfAm   (>60 ml/min/1.73 sqM)  


 


Est GFR (CKD-EPI)NonAf   (>60 ml/min/1.73 sqM)  


 


Glucose   (74-99)  mg/dL


 


Calcium   (8.4-10.2)  mg/dL


 


Magnesium   (1.6-2.3)  mg/dL


 


Total Bilirubin   (0.2-1.3)  mg/dL


 


AST   (14-36)  U/L


 


ALT   (4-34)  U/L


 


Alkaline Phosphatase   ()  U/L


 


Troponin I  <0.012  (0.000-0.034)  ng/mL


 


Total Protein   (6.3-8.2)  g/dL


 


Albumin   (3.5-5.0)  g/dL


 


HCG, Quant   mIU/mL














Disposition


Clinical Impression: 


 Chest pain





Disposition: HOME SELF-CARE


Instructions (If sedation given, give patient instructions):  Costochondritis 

(ED), Chest Pain (ED)


Is patient prescribed a controlled substance at d/c from ED?: No


Referrals: 


David Marvin DO [Primary Care Provider] - 1-2 days


Time of Disposition: 19:15

## 2023-01-13 NOTE — XR
EXAMINATION TYPE: XR chest 2V

 

DATE OF EXAM: 1/13/2023

 

COMPARISON: 10/28/2019

 

HISTORY: Chest pain

 

TECHNIQUE:

 

FINDINGS: The heart and mediastinum are normal. Lungs are clear.

 

Images normal. Bony thorax appears normal.

 

IMPRESSION: Normal chest. No change.

## 2024-12-30 ENCOUNTER — HOSPITAL ENCOUNTER (EMERGENCY)
Dept: HOSPITAL 47 - EC | Age: 29
Discharge: HOME | End: 2024-12-30
Payer: COMMERCIAL

## 2024-12-30 VITALS — RESPIRATION RATE: 18 BRPM

## 2024-12-30 VITALS — HEART RATE: 61 BPM | DIASTOLIC BLOOD PRESSURE: 79 MMHG | TEMPERATURE: 98 F | SYSTOLIC BLOOD PRESSURE: 117 MMHG

## 2024-12-30 DIAGNOSIS — Z88.1: ICD-10-CM

## 2024-12-30 DIAGNOSIS — Z88.8: ICD-10-CM

## 2024-12-30 DIAGNOSIS — Z88.2: ICD-10-CM

## 2024-12-30 DIAGNOSIS — J04.10: Primary | ICD-10-CM

## 2024-12-30 DIAGNOSIS — Z91.018: ICD-10-CM

## 2024-12-30 PROCEDURE — 71046 X-RAY EXAM CHEST 2 VIEWS: CPT

## 2024-12-30 PROCEDURE — 87651 STREP A DNA AMP PROBE: CPT

## 2024-12-30 PROCEDURE — 99283 EMERGENCY DEPT VISIT LOW MDM: CPT

## 2024-12-30 PROCEDURE — 87636 SARSCOV2 & INF A&B AMP PRB: CPT

## 2024-12-30 RX ADMIN — AMOXICILLIN AND CLAVULANATE POTASSIUM STA EACH: 875; 125 TABLET, FILM COATED ORAL at 14:17

## 2024-12-30 NOTE — XR
EXAMINATION TYPE: XR chest 2V

 

DATE OF EXAM: 12/30/2024 12:51 PM

 

COMPARISON: 9/22/2023 

 

CLINICAL INDICATION: Female, 29 years old with history of Cough,

 

TECHNIQUE: XR chest 2V view(s) obtained.

 

FINDINGS:  

The heart size is normal.  

The pulmonary vasculature is normal.

The lungs are clear.

 

IMPRESSION:  

1. No acute pulmonary process.

 

[facility], Workstation: [id number], 12/30/2024 12:57 PM

## 2024-12-30 NOTE — ED
URI HPI





- General


Source: patient, RN notes reviewed


Mode of arrival: ambulatory


Limitations: no limitations





<Loren Mao - Last Filed: 24 12:34>





- General


Source: patient, RN notes reviewed, old records reviewed


Mode of arrival: ambulatory


Limitations: no limitations





- History of Present Illness


MD Complaint: fever, cough, sore throat


-: days(s)


Quality: sharp


Consistency: constant


Improves With: nothing


Worsens With: nothing


Associated Symptoms: chills, myalgias


Treatments Prior to Arrival: none





<David Mendoza - Last Filed: 24 13:57>





- General


Chief Complaint: Upper Respiratory Infection


Stated Complaint: Cough, sore throat, difficulty speaking


Time Seen by Provider: 24 12:25





- History of Present Illness


Initial Comments: 


Quick Note: This is a 29-year-old female who presents to the emergency 

department for coughing, congestion, and a sore throat.  States that it started 

about a week ago and she is now having difficulty swallowing.  Denies any chest 

pain or shortness of breath.


 (Loren Mao)


This is a 29-year-old female to the ER for cough and congestion sore throat 

difficulty swallowing and pain.  Patient has multiple work sick contacts 

possible hospital sick contacts and patient was too sick to work today 

(David Mendoza)





- Related Data


                                  Previous Rx's











 Medication  Instructions  Recorded


 


Amoxicillin/Potassium Clav 1 tab PO Q12HR #20 tab 10/28/19





[Augmentin 875-125 Tablet]  


 


Ipratropium-Albuterol Nebulize 3 ml INHALATION QID #1 box 10/28/19





[Duoneb 0.5 mg-3 mg/3 ml Soln]  


 


predniSONE 50 mg PO DAILY #5 tab 10/28/19


 


Ibuprofen [Motrin] 600 mg PO Q8HR PRN #20 tab 20


 


Metoclopramide [Reglan] 10 mg PO ACHS #12 tab 20


 


Cyclobenzaprine [Flexeril] 10 mg PO TID #20 tab 21


 


Ibuprofen 800 mg PO TID #20 tablet 21


 


Hydrocortisone Oint 1 applic TOPICAL BID 5 Days #5 gm 21





[Hydrocortisone 1% Oint]  


 


Azithromycin [Zithromax Z-pack (6 0 mg PO AS DIRECTED #6 tab 09/24/21





tabs)]  


 


Azithromycin [Zithromax] 500 mg PO DAILY #5 tab 23


 


predniSONE 50 mg PO DAILY #5 tab 23


 


Amoxic-Pot Clav 875-125Mg 1 tab PO Q12HR #20 tablet 24





[Augmentin 875-125]  











                                    Allergies











Allergy/AdvReac Type Severity Reaction Status Date / Time


 


ceftriaxone [From Rocephin] Allergy  Rash/Hives Verified 24 12:27


 


soy Allergy  Swelling Verified 24 12:27


 


sulfamethoxazole Allergy  Rash/Hives/ Verified 24 12:27





[From Bactrim]   fever  


 


sumatriptan [From Imitrex] Allergy  Anaphylaxis Verified 24 12:27


 


trimethoprim [From Bactrim] Allergy  Rash/Hives/ Verified 24 12:27





   fever  














Review of Systems


ROS Other: All systems not noted in ROS Statement are negative.





<Loren Mao - Last Filed: 24 12:34>


ROS Other: All systems not noted in ROS Statement are negative.





<David Mendoza - Last Filed: 24 13:57>


ROS Statement: 


Those systems with pertinent positive or pertinent negative responses have been 

documented in the HPI.








Past Medical History


Past Medical History: Asthma, Pneumonia, Thyroid Disorder


Additional Past Medical History / Comment(s): hypothyroid, migraines,


History of Any Multi-Drug Resistant Organisms: None Reported


Past Surgical History:  Section


Past Anesthesia/Blood Transfusion Reactions: No Reported Reaction


Past Psychological History: Anxiety, Depression


Smoking Status: Never smoker


Past Alcohol Use History: Rare


Past Drug Use History: None Reported





- Past Family History


  ** Mother


Family Medical History: Cancer





<Lorne Mao - Last Filed: 24 12:34>





General Exam


Limitations: no limitations





<Loren Mao - Last Filed: 24 12:34>


General appearance: alert, in no apparent distress


Head exam: Present: atraumatic, normocephalic, normal inspection


Eye exam: Present: normal appearance, PERRL, EOMI.  Absent: scleral icterus, 

conjunctival injection, periorbital swelling


ENT exam: Present: normal exam, mucous membranes moist


Neck exam: Present: normal inspection.  Absent: tenderness, meningismus, 

lymphadenopathy


Respiratory exam: Present: normal lung sounds bilaterally.  Absent: respiratory 

distress, wheezes, rales, rhonchi, stridor


Cardiovascular Exam: Present: regular rate, normal rhythm, normal heart sounds. 

Absent: systolic murmur, diastolic murmur, rubs, gallop, clicks


GI/Abdominal exam: Present: soft, normal bowel sounds.  Absent: distended, 

tenderness, guarding, rebound, rigid


Extremities exam: Present: normal inspection, full ROM, normal capillary refill.

 Absent: tenderness, pedal edema, joint swelling, calf tenderness


Back exam: Present: normal inspection


Neurological exam: Present: alert, oriented X3, CN II-XII intact


Psychiatric exam: Present: normal affect, normal mood


Skin exam: Present: warm, dry, intact, normal color.  Absent: rash





<David Mendoza - Last Filed: 24 13:57>





- General Exam Comments


Initial Comments: 


Visual Physical Exam





Vital signs reviewed





General: Well-appearing, nontoxic, no acute distress.


Head: Normocephalic, atraumatic


Eyes: PERRLA, EOMI


ENT: Airway patent


Chest: Nonlabored breathing


Skin: No visual rash, normal skin tone


Neuro: Alert and oriented 3


Musculoskeletal: No gross abnormalities


 (VotiaraeyLoren)





Course





<David Mendoza - Last Filed: 24 13:57>





                                   Vital Signs











  24





  12:25


 


Temperature 98.4 F


 


Pulse Rate 72


 


Respiratory 18





Rate 


 


Blood Pressure 119/83


 


O2 Sat by Pulse 98





Oximetry 














- Reevaluation(s)


Reevaluation #1: 





24 13:54


Medical records reviewed (David Mendoza)


Reevaluation #2: 





24 13:54


Patient symptoms unchanged (David Mendoza)


Reevaluation #3: 





24 13:54


Patient informed of results and questions answered (David Mendoza)


Reevaluation #4: 





Was pt. sent in by a medical professional or institution (, PA, NP, urgent 

care, hospital, or nursing home...) When possible be specific


@  -no


Did you speak to anyone other than the patient for history (EMS, parent, family,

police, friend...)? What history was obtained from this source 


@  -no


Did you review nursing and triage notes (agree or disagree)?  Why? 


@  -agree


Are old charts reviewed (outside hosp., previous admission, EMS record, old EKG,

old radiological studies, urgent care reports/EKG's, nursing home records)? 

Report findings 


@  -yes


Differential Diagnosis (chest pain, altered mental status, abdominal pain women,

abdominal pain men, vaginal bleeding, weakness, fever, dyspnea, syncope, 

headache, dizziness, GI bleed, back pain, seizure, CVA, palpatations, mental 

health, musculoskeletal)? 


@  -prior


EKG interpreted by me (3pts min.).


@  -yes


X-rays interpreted by me (1pt min.).


@  -yes negative for acute disease


CT interpreted by me (1pt min.).


@  -no


U/S interpreted by me (1pt. min.).


@  -no


What testing was considered but not performed or refused? (CT, X-rays, U/S, 

labs)? Why?


@  -none


What meds were considered but not given or refused? Why?


@  -none


Did you discuss the management of the patient with other professionals 

(professionals i.e. , PA, NP, lab, RT, psych nurse, , , 

teacher, , )? Give summary


@  -no


Was smoking cessation discussed for >3mins.?


@  -no


Was critical care preformed (if so, how long)?


@  -no


Were there social determinants of health that impacted care today? How? 

(Homelessness, low income, unemployed, alcoholism, drug addiction, 

transportation, low edu. Level, literacy, decrease access to med. care, assisted, 

rehab)?


@  -none


Was there de-escalation of care discussed even if they declined (Discuss DNR or 

withdrawal of care, Hospice)? DNR status


@  -no


What co-morbidities impacted this encounter? (DM, HTN, Smoking, COPD, CAD, 

Cancer, CVA, ARF, Chemo, Hep., AIDS, mental health diagnosis, sleep apnea, 

morbid obesity)?


@  -none


Was patient admitted / discharged? Hospital course, mention meds given and 

route, prescriptions, significant lab abnormalities, going to OR and other 

pertinent info.


@  - 


Undiagnosed new problem with uncertain prognosis?


@  -no


Drug Therapy requiring intensive monitoring for toxicity (Heparin, Nitro, 

Insulin, Cardizem)?


@  -no


Were any procedures done?


@  -no


Diagnosis/symptom?


@  -


Acute, or Chronic, or Acute on Chronic?


@  -Acute


Uncomplicated (without systemic symptoms) or Complicated (systemic symptoms)?


@  -Complicated


Side effects of treatment?


@  -no


Exacerbation, Progression, or Severe Exacerbation?


@  -exacerbation


Poses a threat to life or bodily function? How? (Chest pain, USA, MI, pneumonia,

PE, COPD, DKA, ARF, appy, cholecystitis, CVA, Diverticulitis, Homicidal, Suici

ja, threat to staff... and all critical care pts)


@  -yes (David Mendoza)





Medical Decision Making





<Loren Mao - Last Filed: 24 12:34>





- Radiology Data


Radiology results: report reviewed (Chest x-ray is negative for acute disease), 

image reviewed





<David Mendoza - Last Filed: 24 13:57>





- Medical Decision Making


I performed the QuickNote portion of this chart. Signed Loren Mao PA-C.


 (Loren Mao)


29 female to ER for evaluation patient with sore throat will treat for bacterial

tracheitis and patient can be discharged (David Mendoza)





- Lab Data





                                   Lab Results











  24 Range/Units





  12:28 12:28 


 


Influenza Type A (PCR)   Not Detected  (Not Detectd)  


 


Influenza Type B (PCR)   Not Detected  (Not Detectd)  


 


RSV (PCR)   Not Detected  (Not Detectd)  


 


SARS-CoV-2 (PCR)   Not Detected  (Not Detectd)  


 


Group A Strep (PCR)  NOT DETECTED   (Not Detectd)  














Disposition





<Loren Mao - Last Filed: 24 12:34>


Is patient prescribed a controlled substance at d/c from ED?: No


Time of Disposition: 14:00





<David Mendoza - Last Filed: 24 13:57>


Clinical Impression: 


 Acute bacterial tracheitis





Disposition: HOME SELF-CARE


Condition: Good


Instructions (If sedation given, give patient instructions):  Upper Respiratory 

Infection (ED)


Prescriptions: 


Amoxic-Pot Clav 875-125Mg [Augmentin 875-125] 1 tab PO Q12HR #20 tablet


Referrals: 


David Marvin DO [Primary Care Provider] - 1-2 days